# Patient Record
Sex: FEMALE | Race: WHITE | NOT HISPANIC OR LATINO | Employment: UNEMPLOYED | URBAN - METROPOLITAN AREA
[De-identification: names, ages, dates, MRNs, and addresses within clinical notes are randomized per-mention and may not be internally consistent; named-entity substitution may affect disease eponyms.]

---

## 2017-03-07 ENCOUNTER — ALLSCRIPTS OFFICE VISIT (OUTPATIENT)
Dept: OTHER | Facility: OTHER | Age: 62
End: 2017-03-07

## 2017-03-07 DIAGNOSIS — E04.1 NONTOXIC SINGLE THYROID NODULE: ICD-10-CM

## 2017-03-07 DIAGNOSIS — E55.9 VITAMIN D DEFICIENCY: ICD-10-CM

## 2017-03-07 DIAGNOSIS — R73.01 IMPAIRED FASTING GLUCOSE: ICD-10-CM

## 2017-03-07 DIAGNOSIS — M79.10 MYALGIA: ICD-10-CM

## 2017-03-07 DIAGNOSIS — D50.9 IRON DEFICIENCY ANEMIA: ICD-10-CM

## 2017-03-07 DIAGNOSIS — Z13.6 ENCOUNTER FOR SCREENING FOR CARDIOVASCULAR DISORDERS: ICD-10-CM

## 2017-03-08 ENCOUNTER — APPOINTMENT (OUTPATIENT)
Dept: LAB | Facility: CLINIC | Age: 62
End: 2017-03-08
Payer: COMMERCIAL

## 2017-03-08 ENCOUNTER — TRANSCRIBE ORDERS (OUTPATIENT)
Dept: LAB | Facility: CLINIC | Age: 62
End: 2017-03-08

## 2017-03-08 DIAGNOSIS — M79.10 MYALGIA: ICD-10-CM

## 2017-03-08 DIAGNOSIS — E55.9 VITAMIN D DEFICIENCY: ICD-10-CM

## 2017-03-08 DIAGNOSIS — R73.01 IMPAIRED FASTING GLUCOSE: ICD-10-CM

## 2017-03-08 DIAGNOSIS — E04.1 NONTOXIC SINGLE THYROID NODULE: ICD-10-CM

## 2017-03-08 DIAGNOSIS — D50.9 IRON DEFICIENCY ANEMIA: ICD-10-CM

## 2017-03-08 DIAGNOSIS — Z13.6 ENCOUNTER FOR SCREENING FOR CARDIOVASCULAR DISORDERS: ICD-10-CM

## 2017-03-08 LAB
25(OH)D3 SERPL-MCNC: 30.6 NG/ML (ref 30–100)
ALBUMIN SERPL BCP-MCNC: 3.8 G/DL (ref 3.5–5)
ALP SERPL-CCNC: 82 U/L (ref 46–116)
ALT SERPL W P-5'-P-CCNC: 27 U/L (ref 12–78)
ANION GAP SERPL CALCULATED.3IONS-SCNC: 7 MMOL/L (ref 4–13)
AST SERPL W P-5'-P-CCNC: 13 U/L (ref 5–45)
BASOPHILS # BLD AUTO: 0.05 THOUSANDS/ΜL (ref 0–0.1)
BASOPHILS NFR BLD AUTO: 1 % (ref 0–1)
BILIRUB DIRECT SERPL-MCNC: 0.18 MG/DL (ref 0–0.2)
BILIRUB SERPL-MCNC: 0.69 MG/DL (ref 0.2–1)
BUN SERPL-MCNC: 13 MG/DL (ref 5–25)
CALCIUM SERPL-MCNC: 9.3 MG/DL (ref 8.3–10.1)
CHLORIDE SERPL-SCNC: 106 MMOL/L (ref 100–108)
CHOLEST SERPL-MCNC: 177 MG/DL (ref 50–200)
CO2 SERPL-SCNC: 31 MMOL/L (ref 21–32)
CREAT SERPL-MCNC: 0.68 MG/DL (ref 0.6–1.3)
EOSINOPHIL # BLD AUTO: 0.16 THOUSAND/ΜL (ref 0–0.61)
EOSINOPHIL NFR BLD AUTO: 3 % (ref 0–6)
ERYTHROCYTE [DISTWIDTH] IN BLOOD BY AUTOMATED COUNT: 16.2 % (ref 11.6–15.1)
EST. AVERAGE GLUCOSE BLD GHB EST-MCNC: 117 MG/DL
FERRITIN SERPL-MCNC: 21 NG/ML (ref 8–388)
GFR SERPL CREATININE-BSD FRML MDRD: >60 ML/MIN/1.73SQ M
GLUCOSE SERPL-MCNC: 69 MG/DL (ref 65–140)
HBA1C MFR BLD: 5.7 % (ref 4.2–6.3)
HCT VFR BLD AUTO: 43.6 % (ref 34.8–46.1)
HDLC SERPL-MCNC: 86 MG/DL (ref 40–60)
HGB BLD-MCNC: 13.9 G/DL (ref 11.5–15.4)
IRON SERPL-MCNC: 76 UG/DL (ref 50–170)
LDLC SERPL CALC-MCNC: 81 MG/DL (ref 0–100)
LYMPHOCYTES # BLD AUTO: 1.87 THOUSANDS/ΜL (ref 0.6–4.47)
LYMPHOCYTES NFR BLD AUTO: 37 % (ref 14–44)
MCH RBC QN AUTO: 27.7 PG (ref 26.8–34.3)
MCHC RBC AUTO-ENTMCNC: 31.9 G/DL (ref 31.4–37.4)
MCV RBC AUTO: 87 FL (ref 82–98)
MONOCYTES # BLD AUTO: 0.39 THOUSAND/ΜL (ref 0.17–1.22)
MONOCYTES NFR BLD AUTO: 8 % (ref 4–12)
NEUTROPHILS # BLD AUTO: 2.57 THOUSANDS/ΜL (ref 1.85–7.62)
NEUTS SEG NFR BLD AUTO: 51 % (ref 43–75)
NRBC BLD AUTO-RTO: 0 /100 WBCS
PLATELET # BLD AUTO: 338 THOUSANDS/UL (ref 149–390)
PMV BLD AUTO: 11.2 FL (ref 8.9–12.7)
POTASSIUM SERPL-SCNC: 4.2 MMOL/L (ref 3.5–5.3)
PROT SERPL-MCNC: 7.3 G/DL (ref 6.4–8.2)
RBC # BLD AUTO: 5.02 MILLION/UL (ref 3.81–5.12)
SODIUM SERPL-SCNC: 144 MMOL/L (ref 136–145)
TIBC SERPL-MCNC: 475 UG/DL (ref 250–450)
TRANSFERRIN SERPL-MCNC: 369 MG/DL (ref 200–400)
TRIGL SERPL-MCNC: 51 MG/DL
TSH SERPL DL<=0.05 MIU/L-ACNC: 2.7 UIU/ML (ref 0.36–3.74)
WBC # BLD AUTO: 5.05 THOUSAND/UL (ref 4.31–10.16)

## 2017-03-08 PROCEDURE — 36415 COLL VENOUS BLD VENIPUNCTURE: CPT

## 2017-03-08 PROCEDURE — 85025 COMPLETE CBC W/AUTO DIFF WBC: CPT

## 2017-03-08 PROCEDURE — 82306 VITAMIN D 25 HYDROXY: CPT

## 2017-03-08 PROCEDURE — 80061 LIPID PANEL: CPT

## 2017-03-08 PROCEDURE — 84443 ASSAY THYROID STIM HORMONE: CPT

## 2017-03-08 PROCEDURE — 84466 ASSAY OF TRANSFERRIN: CPT

## 2017-03-08 PROCEDURE — 83036 HEMOGLOBIN GLYCOSYLATED A1C: CPT

## 2017-03-08 PROCEDURE — 83540 ASSAY OF IRON: CPT

## 2017-03-08 PROCEDURE — 80048 BASIC METABOLIC PNL TOTAL CA: CPT

## 2017-03-08 PROCEDURE — 83550 IRON BINDING TEST: CPT

## 2017-03-08 PROCEDURE — 82728 ASSAY OF FERRITIN: CPT

## 2017-03-08 PROCEDURE — 80076 HEPATIC FUNCTION PANEL: CPT

## 2017-03-21 ENCOUNTER — ALLSCRIPTS OFFICE VISIT (OUTPATIENT)
Dept: OTHER | Facility: OTHER | Age: 62
End: 2017-03-21

## 2017-08-01 ENCOUNTER — ALLSCRIPTS OFFICE VISIT (OUTPATIENT)
Dept: OTHER | Facility: OTHER | Age: 62
End: 2017-08-01

## 2017-08-01 ENCOUNTER — APPOINTMENT (OUTPATIENT)
Dept: LAB | Facility: CLINIC | Age: 62
End: 2017-08-01
Payer: COMMERCIAL

## 2017-08-01 DIAGNOSIS — D64.9 ANEMIA: ICD-10-CM

## 2017-08-01 DIAGNOSIS — Z13.6 ENCOUNTER FOR SCREENING FOR CARDIOVASCULAR DISORDERS: ICD-10-CM

## 2017-08-01 DIAGNOSIS — E55.9 VITAMIN D DEFICIENCY: ICD-10-CM

## 2017-08-01 DIAGNOSIS — R73.01 IMPAIRED FASTING GLUCOSE: ICD-10-CM

## 2017-08-01 LAB
25(OH)D3 SERPL-MCNC: 36.4 NG/ML (ref 30–100)
ALBUMIN SERPL BCP-MCNC: 3.9 G/DL (ref 3.5–5)
ALP SERPL-CCNC: 89 U/L (ref 46–116)
ALT SERPL W P-5'-P-CCNC: 28 U/L (ref 12–78)
ANION GAP SERPL CALCULATED.3IONS-SCNC: 6 MMOL/L (ref 4–13)
AST SERPL W P-5'-P-CCNC: 21 U/L (ref 5–45)
BASOPHILS # BLD AUTO: 0.05 THOUSANDS/ΜL (ref 0–0.1)
BASOPHILS NFR BLD AUTO: 1 % (ref 0–1)
BILIRUB SERPL-MCNC: 0.63 MG/DL (ref 0.2–1)
BUN SERPL-MCNC: 13 MG/DL (ref 5–25)
CALCIUM SERPL-MCNC: 9 MG/DL (ref 8.3–10.1)
CHLORIDE SERPL-SCNC: 106 MMOL/L (ref 100–108)
CHOLEST SERPL-MCNC: 150 MG/DL (ref 50–200)
CO2 SERPL-SCNC: 28 MMOL/L (ref 21–32)
CREAT SERPL-MCNC: 0.7 MG/DL (ref 0.6–1.3)
EOSINOPHIL # BLD AUTO: 0.21 THOUSAND/ΜL (ref 0–0.61)
EOSINOPHIL NFR BLD AUTO: 3 % (ref 0–6)
ERYTHROCYTE [DISTWIDTH] IN BLOOD BY AUTOMATED COUNT: 13.1 % (ref 11.6–15.1)
EST. AVERAGE GLUCOSE BLD GHB EST-MCNC: 120 MG/DL
FERRITIN SERPL-MCNC: 34 NG/ML (ref 8–388)
GFR SERPL CREATININE-BSD FRML MDRD: 94 ML/MIN/1.73SQ M
GLUCOSE SERPL-MCNC: 91 MG/DL (ref 65–140)
HBA1C MFR BLD: 5.8 % (ref 4.2–6.3)
HCT VFR BLD AUTO: 42.2 % (ref 34.8–46.1)
HDLC SERPL-MCNC: 70 MG/DL (ref 40–60)
HGB BLD-MCNC: 13.9 G/DL (ref 11.5–15.4)
IRON SERPL-MCNC: 67 UG/DL (ref 50–170)
LDLC SERPL CALC-MCNC: 51 MG/DL (ref 0–100)
LYMPHOCYTES # BLD AUTO: 2.24 THOUSANDS/ΜL (ref 0.6–4.47)
LYMPHOCYTES NFR BLD AUTO: 32 % (ref 14–44)
MCH RBC QN AUTO: 29.8 PG (ref 26.8–34.3)
MCHC RBC AUTO-ENTMCNC: 32.9 G/DL (ref 31.4–37.4)
MCV RBC AUTO: 90 FL (ref 82–98)
MONOCYTES # BLD AUTO: 0.55 THOUSAND/ΜL (ref 0.17–1.22)
MONOCYTES NFR BLD AUTO: 8 % (ref 4–12)
NEUTROPHILS # BLD AUTO: 3.95 THOUSANDS/ΜL (ref 1.85–7.62)
NEUTS SEG NFR BLD AUTO: 56 % (ref 43–75)
NRBC BLD AUTO-RTO: 0 /100 WBCS
PLATELET # BLD AUTO: 317 THOUSANDS/UL (ref 149–390)
PMV BLD AUTO: 12 FL (ref 8.9–12.7)
POTASSIUM SERPL-SCNC: 3.9 MMOL/L (ref 3.5–5.3)
PROT SERPL-MCNC: 7.3 G/DL (ref 6.4–8.2)
RBC # BLD AUTO: 4.67 MILLION/UL (ref 3.81–5.12)
SODIUM SERPL-SCNC: 140 MMOL/L (ref 136–145)
TIBC SERPL-MCNC: 450 UG/DL (ref 250–450)
TRANSFERRIN SERPL-MCNC: 378 MG/DL (ref 200–400)
TRIGL SERPL-MCNC: 143 MG/DL
TSH SERPL DL<=0.05 MIU/L-ACNC: 1.93 UIU/ML (ref 0.36–3.74)
WBC # BLD AUTO: 7.01 THOUSAND/UL (ref 4.31–10.16)

## 2017-08-01 PROCEDURE — 84466 ASSAY OF TRANSFERRIN: CPT

## 2017-08-01 PROCEDURE — 85025 COMPLETE CBC W/AUTO DIFF WBC: CPT

## 2017-08-01 PROCEDURE — 83550 IRON BINDING TEST: CPT

## 2017-08-01 PROCEDURE — 80053 COMPREHEN METABOLIC PANEL: CPT

## 2017-08-01 PROCEDURE — 83036 HEMOGLOBIN GLYCOSYLATED A1C: CPT

## 2017-08-01 PROCEDURE — 36415 COLL VENOUS BLD VENIPUNCTURE: CPT

## 2017-08-01 PROCEDURE — 83540 ASSAY OF IRON: CPT

## 2017-08-01 PROCEDURE — 84443 ASSAY THYROID STIM HORMONE: CPT

## 2017-08-01 PROCEDURE — 82306 VITAMIN D 25 HYDROXY: CPT

## 2017-08-01 PROCEDURE — 82728 ASSAY OF FERRITIN: CPT

## 2017-08-01 PROCEDURE — 80061 LIPID PANEL: CPT

## 2017-08-10 ENCOUNTER — GENERIC CONVERSION - ENCOUNTER (OUTPATIENT)
Dept: OTHER | Facility: OTHER | Age: 62
End: 2017-08-10

## 2017-08-22 ENCOUNTER — ALLSCRIPTS OFFICE VISIT (OUTPATIENT)
Dept: OTHER | Facility: OTHER | Age: 62
End: 2017-08-22

## 2017-09-25 DIAGNOSIS — E55.9 VITAMIN D DEFICIENCY: ICD-10-CM

## 2017-09-25 DIAGNOSIS — Z13.6 ENCOUNTER FOR SCREENING FOR CARDIOVASCULAR DISORDERS: ICD-10-CM

## 2017-09-25 DIAGNOSIS — D64.9 ANEMIA: ICD-10-CM

## 2017-09-25 DIAGNOSIS — R73.01 IMPAIRED FASTING GLUCOSE: ICD-10-CM

## 2018-01-12 VITALS
HEIGHT: 66 IN | DIASTOLIC BLOOD PRESSURE: 88 MMHG | BODY MASS INDEX: 25.57 KG/M2 | SYSTOLIC BLOOD PRESSURE: 138 MMHG | HEART RATE: 80 BPM | WEIGHT: 159.13 LBS

## 2018-01-13 VITALS
HEIGHT: 66 IN | DIASTOLIC BLOOD PRESSURE: 70 MMHG | WEIGHT: 159.25 LBS | OXYGEN SATURATION: 98 % | SYSTOLIC BLOOD PRESSURE: 110 MMHG | BODY MASS INDEX: 25.59 KG/M2 | HEART RATE: 78 BPM

## 2018-01-14 VITALS
HEART RATE: 60 BPM | BODY MASS INDEX: 25.57 KG/M2 | DIASTOLIC BLOOD PRESSURE: 76 MMHG | WEIGHT: 159.13 LBS | HEIGHT: 66 IN | SYSTOLIC BLOOD PRESSURE: 124 MMHG

## 2018-01-14 VITALS
WEIGHT: 157.25 LBS | BODY MASS INDEX: 25.27 KG/M2 | HEART RATE: 80 BPM | HEIGHT: 66 IN | SYSTOLIC BLOOD PRESSURE: 112 MMHG | DIASTOLIC BLOOD PRESSURE: 82 MMHG

## 2018-02-26 DIAGNOSIS — R73.01 IMPAIRED FASTING GLUCOSE: ICD-10-CM

## 2018-02-26 DIAGNOSIS — D50.9 IRON DEFICIENCY ANEMIA: ICD-10-CM

## 2018-02-26 DIAGNOSIS — E04.1 NONTOXIC SINGLE THYROID NODULE: ICD-10-CM

## 2018-02-26 DIAGNOSIS — Z13.6 ENCOUNTER FOR SCREENING FOR CARDIOVASCULAR DISORDERS: ICD-10-CM

## 2018-02-28 ENCOUNTER — APPOINTMENT (OUTPATIENT)
Dept: LAB | Facility: CLINIC | Age: 63
End: 2018-02-28
Payer: COMMERCIAL

## 2018-02-28 DIAGNOSIS — E04.1 NONTOXIC SINGLE THYROID NODULE: ICD-10-CM

## 2018-02-28 DIAGNOSIS — R73.01 IMPAIRED FASTING GLUCOSE: ICD-10-CM

## 2018-02-28 DIAGNOSIS — Z13.6 ENCOUNTER FOR SCREENING FOR CARDIOVASCULAR DISORDERS: ICD-10-CM

## 2018-02-28 DIAGNOSIS — D64.9 ANEMIA, UNSPECIFIED TYPE: Primary | ICD-10-CM

## 2018-02-28 DIAGNOSIS — D50.9 IRON DEFICIENCY ANEMIA: ICD-10-CM

## 2018-02-28 DIAGNOSIS — E55.9 MILD VITAMIN D DEFICIENCY: ICD-10-CM

## 2018-02-28 LAB
ALBUMIN SERPL BCP-MCNC: 4.2 G/DL (ref 3.5–5)
ALP SERPL-CCNC: 77 U/L (ref 46–116)
ALT SERPL W P-5'-P-CCNC: 27 U/L (ref 12–78)
ANION GAP SERPL CALCULATED.3IONS-SCNC: 5 MMOL/L (ref 4–13)
AST SERPL W P-5'-P-CCNC: 15 U/L (ref 5–45)
BILIRUB SERPL-MCNC: 0.49 MG/DL (ref 0.2–1)
BUN SERPL-MCNC: 16 MG/DL (ref 5–25)
CALCIUM SERPL-MCNC: 9.3 MG/DL (ref 8.3–10.1)
CHLORIDE SERPL-SCNC: 107 MMOL/L (ref 100–108)
CHOLEST SERPL-MCNC: 165 MG/DL (ref 50–200)
CO2 SERPL-SCNC: 29 MMOL/L (ref 21–32)
CREAT SERPL-MCNC: 0.71 MG/DL (ref 0.6–1.3)
ERYTHROCYTE [DISTWIDTH] IN BLOOD BY AUTOMATED COUNT: 13.8 % (ref 11.6–15.1)
EST. AVERAGE GLUCOSE BLD GHB EST-MCNC: 123 MG/DL
GFR SERPL CREATININE-BSD FRML MDRD: 93 ML/MIN/1.73SQ M
GLUCOSE P FAST SERPL-MCNC: 93 MG/DL (ref 65–99)
HBA1C MFR BLD: 5.9 % (ref 4.2–6.3)
HCT VFR BLD AUTO: 42.5 % (ref 34.8–46.1)
HDLC SERPL-MCNC: 75 MG/DL (ref 40–60)
HGB BLD-MCNC: 14.1 G/DL (ref 11.5–15.4)
LDLC SERPL CALC-MCNC: 82 MG/DL (ref 0–100)
MCH RBC QN AUTO: 29.9 PG (ref 26.8–34.3)
MCHC RBC AUTO-ENTMCNC: 33.2 G/DL (ref 31.4–37.4)
MCV RBC AUTO: 90 FL (ref 82–98)
PLATELET # BLD AUTO: 291 THOUSANDS/UL (ref 149–390)
PMV BLD AUTO: 12.5 FL (ref 8.9–12.7)
POTASSIUM SERPL-SCNC: 3.8 MMOL/L (ref 3.5–5.3)
PROT SERPL-MCNC: 7.5 G/DL (ref 6.4–8.2)
RBC # BLD AUTO: 4.71 MILLION/UL (ref 3.81–5.12)
SODIUM SERPL-SCNC: 141 MMOL/L (ref 136–145)
TRIGL SERPL-MCNC: 42 MG/DL
TSH SERPL DL<=0.05 MIU/L-ACNC: 4.87 UIU/ML (ref 0.36–3.74)
WBC # BLD AUTO: 5.56 THOUSAND/UL (ref 4.31–10.16)

## 2018-02-28 PROCEDURE — 80053 COMPREHEN METABOLIC PANEL: CPT

## 2018-02-28 PROCEDURE — 83036 HEMOGLOBIN GLYCOSYLATED A1C: CPT

## 2018-02-28 PROCEDURE — 84443 ASSAY THYROID STIM HORMONE: CPT

## 2018-02-28 PROCEDURE — 36415 COLL VENOUS BLD VENIPUNCTURE: CPT

## 2018-02-28 PROCEDURE — 85027 COMPLETE CBC AUTOMATED: CPT

## 2018-02-28 PROCEDURE — 80061 LIPID PANEL: CPT

## 2018-03-06 ENCOUNTER — OFFICE VISIT (OUTPATIENT)
Dept: INTERNAL MEDICINE CLINIC | Facility: CLINIC | Age: 63
End: 2018-03-06
Payer: COMMERCIAL

## 2018-03-06 ENCOUNTER — LAB (OUTPATIENT)
Dept: LAB | Facility: CLINIC | Age: 63
End: 2018-03-06
Payer: COMMERCIAL

## 2018-03-06 VITALS
DIASTOLIC BLOOD PRESSURE: 70 MMHG | WEIGHT: 164.8 LBS | HEART RATE: 80 BPM | BODY MASS INDEX: 26.6 KG/M2 | SYSTOLIC BLOOD PRESSURE: 120 MMHG

## 2018-03-06 DIAGNOSIS — E55.9 VITAMIN D DEFICIENCY: ICD-10-CM

## 2018-03-06 DIAGNOSIS — E55.9 MILD VITAMIN D DEFICIENCY: ICD-10-CM

## 2018-03-06 DIAGNOSIS — C18.9 MUCINOUS ADENOCARCINOMA OF COLON (HCC): ICD-10-CM

## 2018-03-06 DIAGNOSIS — E03.9 PRIMARY HYPOTHYROIDISM: Primary | ICD-10-CM

## 2018-03-06 PROBLEM — R91.1 PULMONARY NODULE: Status: ACTIVE | Noted: 2018-03-06

## 2018-03-06 PROBLEM — R73.01 IMPAIRED FASTING GLUCOSE: Status: ACTIVE | Noted: 2017-03-07

## 2018-03-06 PROCEDURE — 36415 COLL VENOUS BLD VENIPUNCTURE: CPT

## 2018-03-06 PROCEDURE — 82652 VIT D 1 25-DIHYDROXY: CPT

## 2018-03-06 PROCEDURE — 99214 OFFICE O/P EST MOD 30 MIN: CPT | Performed by: NURSE PRACTITIONER

## 2018-03-06 PROCEDURE — 1036F TOBACCO NON-USER: CPT | Performed by: NURSE PRACTITIONER

## 2018-03-06 RX ORDER — UBIDECARENONE 75 MG
CAPSULE ORAL
COMMUNITY
End: 2018-10-09

## 2018-03-06 RX ORDER — FLUOCINONIDE 0.5 MG/G
OINTMENT TOPICAL
COMMUNITY
Start: 2015-08-04 | End: 2018-10-09

## 2018-03-06 RX ORDER — AZELAIC ACID 0.15 G/G
GEL TOPICAL DAILY
COMMUNITY
Start: 2015-08-04 | End: 2018-10-09

## 2018-03-06 RX ORDER — B-COMPLEX WITH VITAMIN C
TABLET ORAL
COMMUNITY
End: 2018-10-09

## 2018-03-06 RX ORDER — MAG HYDROX/ALUMINUM HYD/SIMETH 400-400-40
SUSPENSION, ORAL (FINAL DOSE FORM) ORAL
COMMUNITY
Start: 2017-03-07

## 2018-03-06 RX ORDER — CHLORAL HYDRATE 500 MG
CAPSULE ORAL
COMMUNITY
End: 2018-10-09

## 2018-03-06 NOTE — PATIENT INSTRUCTIONS
Hypothyroidism-subclinical no overt symptoms continue to monitor    Hx of hypercalcium-likely related to vitamin-D deficiency check vitamin-D level will contact her with the results    Hx of colon cancer-over 1 year cancer-free with normal CT chest abdomen pelvis    Health Maintenance-she is up-to-date with mammogram she follows with [facility] for gynecology    Pulmonary nodule-stable at 1 year [address] is monitoring

## 2018-03-06 NOTE — PROGRESS NOTES
Assessment/Plan:  Hypothyroidism-subclinical no overt symptoms continue to monitor    Hx of hypercalcium-likely related to vitamin-D deficiency check vitamin-D level will contact her with the results    Hx of colon cancer-over 1 year cancer-free with normal CT chest abdomen pelvis    Health Maintenance-she is up-to-date with mammogram she follows with [address] [place] [address] for gynecology    Pulmonary nodule-stable at 1 year [place] [address] [facility] is monitoring         Diagnoses and all orders for this visit:    Primary hypothyroidism    Mild vitamin D deficiency    Mucinous adenocarcinoma of colon ([facility] [address] )    Vitamin D deficiency  -     Vitamin D 1,25 dihydroxy; Future    Other orders  -     Calcium-Magnesium-Vitamin D 185- MG-MG-UNIT CAPS; Take by mouth  -     Azelaic Acid (FINACEA) 15 % cream; Apply topically daily  -     Omega-3 Fatty Acids (FISH OIL) 1,000 mg; Take by mouth  -     fluocinonide (LIDEX) 0 05 % ointment; Apply topically  -     Pediatric Multivitamins-Fl (MULTIVITAMINS/FL PO); Take by mouth  -     B Complex Vitamins (VITAMIN B COMPLEX) TABS; Take by mouth  -     cyanocobalamin (VITAMIN B-12) 100 mcg tablet; Take by mouth  -     Cholecalciferol (VITAMIN D3) 5000 units CAPS; Take by mouth          Subjective:      Patient ID: Nguyen Borrero is a 63 y o  female  Feeling well  Following a vegetarain diet every since her colon ca  Following in [place] [address] for cancer  Had mammogram/had ct to follow up pulm nodules  Calcium was high in feb but now normal          The following portions of the patient's history were reviewed and updated as appropriate: allergies, current medications, past family history, past medical history, past social history, past surgical history and problem list     Review of Systems   Constitutional: Negative for appetite change, chills, diaphoresis, fatigue, fever and unexpected weight change  HENT: Negative for postnasal drip and sneezing      Eyes: Negative for visual disturbance  Respiratory: Negative for chest tightness and shortness of breath  Cardiovascular: Negative for chest pain, palpitations and leg swelling  Gastrointestinal: Negative for abdominal pain and blood in stool  Endocrine: Negative for cold intolerance, heat intolerance, polydipsia, polyphagia and polyuria  Genitourinary: Negative for difficulty urinating, dysuria, frequency and urgency  Musculoskeletal: Negative for arthralgias and myalgias  Skin: Negative for rash and wound  Neurological: Negative for dizziness, weakness, light-headedness and headaches  Hematological: Negative for adenopathy  Psychiatric/Behavioral: Negative for confusion, dysphoric mood and sleep disturbance  The patient is not nervous/anxious  Objective:      /70   Pulse 80   Wt 74 8 kg (164 lb 12 8 oz)   BMI 26 60 kg/m²          Physical Exam   Constitutional: She is oriented to person, place, and time  She appears well-developed  No distress  HENT:   Head: Normocephalic and atraumatic  Nose: Nose normal    Mouth/Throat: Oropharynx is clear and moist    Eyes: Conjunctivae and EOM are normal  Pupils are equal, round, and reactive to light  Neck: Normal range of motion  Neck supple  No JVD present  No tracheal deviation present  No thyromegaly present  Cardiovascular: Normal rate, regular rhythm, normal heart sounds and intact distal pulses  Exam reveals no gallop and no friction rub  No murmur heard  Pulmonary/Chest: Effort normal and breath sounds normal  No respiratory distress  She has no wheezes  She has no rales  Abdominal: Soft  Bowel sounds are normal  She exhibits no distension  There is no tenderness  Musculoskeletal: Normal range of motion  She exhibits no edema  Lymphadenopathy:     She has no cervical adenopathy  Neurological: She is alert and oriented to person, place, and time  No cranial nerve deficit  Skin: Skin is warm and dry  No rash noted  She is not diaphoretic  Psychiatric: She has a normal mood and affect   Her behavior is normal  Judgment and thought content normal

## 2018-03-08 ENCOUNTER — TELEPHONE (OUTPATIENT)
Dept: INTERNAL MEDICINE CLINIC | Facility: CLINIC | Age: 63
End: 2018-03-08

## 2018-03-09 ENCOUNTER — TELEPHONE (OUTPATIENT)
Dept: INTERNAL MEDICINE CLINIC | Facility: CLINIC | Age: 63
End: 2018-03-09

## 2018-03-09 LAB — 1,25(OH)2D3 SERPL-MCNC: 78.5 PG/ML (ref 19.9–79.3)

## 2018-03-09 NOTE — TELEPHONE ENCOUNTER
----- Message from Tomeka Alegre sent at 3/9/2018  4:40 PM EST -----  Her vitamin d was perfect at 78 should be higher than 30    The info for the doctor that does the breast imaging instead of mammogram is dr Raven Couch [phone]  website is [email or web address]

## 2018-03-09 NOTE — PROGRESS NOTES
Her vitamin d was perfect at 68 should be higher than 30    The info for the doctor that does the breast imaging instead of mammogram is dr Qian Rene [phone]  website is [email or web address]

## 2018-03-12 ENCOUNTER — TELEPHONE (OUTPATIENT)
Dept: INTERNAL MEDICINE CLINIC | Facility: CLINIC | Age: 63
End: 2018-03-12

## 2018-03-12 NOTE — TELEPHONE ENCOUNTER
Pt  requesting to speak w/ Mena Arellano regarding her iron deficiency  She's concerned because of her history of cancer     Pls call her at work [phone]

## 2018-08-30 ENCOUNTER — TELEPHONE (OUTPATIENT)
Dept: INTERNAL MEDICINE CLINIC | Facility: CLINIC | Age: 63
End: 2018-08-30

## 2018-08-30 NOTE — TELEPHONE ENCOUNTER
ASKING  TO SPEAK WITH JESU  PT  IS   OUT  OF  TOWN    SHE IS  BEING  TREATED  FOR  CANCER    CX  MESSAGE  SORRY NOW  PT  IS  SAYING  JUST NEEDS APPT

## 2018-10-02 ENCOUNTER — TELEPHONE (OUTPATIENT)
Dept: INTERNAL MEDICINE CLINIC | Facility: CLINIC | Age: 63
End: 2018-10-02

## 2018-10-02 DIAGNOSIS — D50.9 IRON DEFICIENCY ANEMIA, UNSPECIFIED IRON DEFICIENCY ANEMIA TYPE: ICD-10-CM

## 2018-10-02 DIAGNOSIS — Z11.59 ENCOUNTER FOR HEPATITIS C SCREENING TEST FOR LOW RISK PATIENT: ICD-10-CM

## 2018-10-02 DIAGNOSIS — C18.9 MUCINOUS ADENOCARCINOMA OF COLON (HCC): Primary | ICD-10-CM

## 2018-10-02 DIAGNOSIS — E03.9 PRIMARY HYPOTHYROIDISM: ICD-10-CM

## 2018-10-02 DIAGNOSIS — R73.01 IMPAIRED FASTING GLUCOSE: ICD-10-CM

## 2018-10-02 NOTE — TELEPHONE ENCOUNTER
PT WOULD LIKE LABS ORDERED FOR HER 6 MONTH  APPT ON TUES 10/9/18    PLEASE ADVISE, CALL BACK UPON COMPLETION

## 2018-10-03 ENCOUNTER — APPOINTMENT (OUTPATIENT)
Dept: LAB | Facility: CLINIC | Age: 63
End: 2018-10-03
Payer: COMMERCIAL

## 2018-10-03 DIAGNOSIS — E03.9 PRIMARY HYPOTHYROIDISM: ICD-10-CM

## 2018-10-03 DIAGNOSIS — C18.9 MUCINOUS ADENOCARCINOMA OF COLON (HCC): ICD-10-CM

## 2018-10-03 DIAGNOSIS — R73.01 IMPAIRED FASTING GLUCOSE: ICD-10-CM

## 2018-10-03 DIAGNOSIS — Z11.59 ENCOUNTER FOR HEPATITIS C SCREENING TEST FOR LOW RISK PATIENT: ICD-10-CM

## 2018-10-03 DIAGNOSIS — D50.9 IRON DEFICIENCY ANEMIA, UNSPECIFIED IRON DEFICIENCY ANEMIA TYPE: ICD-10-CM

## 2018-10-03 LAB
ALBUMIN SERPL BCP-MCNC: 3.8 G/DL (ref 3.5–5)
ALP SERPL-CCNC: 78 U/L (ref 46–116)
ALT SERPL W P-5'-P-CCNC: 28 U/L (ref 12–78)
ANION GAP SERPL CALCULATED.3IONS-SCNC: 5 MMOL/L (ref 4–13)
AST SERPL W P-5'-P-CCNC: 15 U/L (ref 5–45)
BASOPHILS # BLD AUTO: 0.06 THOUSANDS/ΜL (ref 0–0.1)
BASOPHILS NFR BLD AUTO: 1 % (ref 0–1)
BILIRUB DIRECT SERPL-MCNC: 0.15 MG/DL (ref 0–0.2)
BILIRUB SERPL-MCNC: 0.62 MG/DL (ref 0.2–1)
BUN SERPL-MCNC: 19 MG/DL (ref 5–25)
CALCIUM SERPL-MCNC: 8.8 MG/DL (ref 8.3–10.1)
CHLORIDE SERPL-SCNC: 107 MMOL/L (ref 100–108)
CHOLEST SERPL-MCNC: 157 MG/DL (ref 50–200)
CO2 SERPL-SCNC: 28 MMOL/L (ref 21–32)
CREAT SERPL-MCNC: 0.64 MG/DL (ref 0.6–1.3)
EOSINOPHIL # BLD AUTO: 0.28 THOUSAND/ΜL (ref 0–0.61)
EOSINOPHIL NFR BLD AUTO: 5 % (ref 0–6)
ERYTHROCYTE [DISTWIDTH] IN BLOOD BY AUTOMATED COUNT: 12.9 % (ref 11.6–15.1)
EST. AVERAGE GLUCOSE BLD GHB EST-MCNC: 114 MG/DL
FERRITIN SERPL-MCNC: 25 NG/ML (ref 8–388)
GFR SERPL CREATININE-BSD FRML MDRD: 97 ML/MIN/1.73SQ M
GLUCOSE P FAST SERPL-MCNC: 95 MG/DL (ref 65–99)
HBA1C MFR BLD: 5.6 % (ref 4.2–6.3)
HCT VFR BLD AUTO: 44.1 % (ref 34.8–46.1)
HCV AB SER QL: NORMAL
HDLC SERPL-MCNC: 67 MG/DL (ref 40–60)
HGB BLD-MCNC: 14 G/DL (ref 11.5–15.4)
IMM GRANULOCYTES # BLD AUTO: 0.01 THOUSAND/UL (ref 0–0.2)
IMM GRANULOCYTES NFR BLD AUTO: 0 % (ref 0–2)
IRON SATN MFR SERPL: 11 %
IRON SERPL-MCNC: 49 UG/DL (ref 50–170)
LDLC SERPL CALC-MCNC: 75 MG/DL (ref 0–100)
LYMPHOCYTES # BLD AUTO: 2.02 THOUSANDS/ΜL (ref 0.6–4.47)
LYMPHOCYTES NFR BLD AUTO: 34 % (ref 14–44)
MCH RBC QN AUTO: 29.7 PG (ref 26.8–34.3)
MCHC RBC AUTO-ENTMCNC: 31.7 G/DL (ref 31.4–37.4)
MCV RBC AUTO: 94 FL (ref 82–98)
MONOCYTES # BLD AUTO: 0.46 THOUSAND/ΜL (ref 0.17–1.22)
MONOCYTES NFR BLD AUTO: 8 % (ref 4–12)
NEUTROPHILS # BLD AUTO: 3.04 THOUSANDS/ΜL (ref 1.85–7.62)
NEUTS SEG NFR BLD AUTO: 52 % (ref 43–75)
NONHDLC SERPL-MCNC: 90 MG/DL
NRBC BLD AUTO-RTO: 0 /100 WBCS
PLATELET # BLD AUTO: 306 THOUSANDS/UL (ref 149–390)
PMV BLD AUTO: 11.6 FL (ref 8.9–12.7)
POTASSIUM SERPL-SCNC: 3.5 MMOL/L (ref 3.5–5.3)
PROT SERPL-MCNC: 7.4 G/DL (ref 6.4–8.2)
RBC # BLD AUTO: 4.71 MILLION/UL (ref 3.81–5.12)
SODIUM SERPL-SCNC: 140 MMOL/L (ref 136–145)
TIBC SERPL-MCNC: 431 UG/DL (ref 250–450)
TRIGL SERPL-MCNC: 77 MG/DL
TSH SERPL DL<=0.05 MIU/L-ACNC: 6.91 UIU/ML (ref 0.36–3.74)
WBC # BLD AUTO: 5.87 THOUSAND/UL (ref 4.31–10.16)

## 2018-10-03 PROCEDURE — 80061 LIPID PANEL: CPT

## 2018-10-03 PROCEDURE — 83540 ASSAY OF IRON: CPT

## 2018-10-03 PROCEDURE — 83036 HEMOGLOBIN GLYCOSYLATED A1C: CPT

## 2018-10-03 PROCEDURE — 86803 HEPATITIS C AB TEST: CPT

## 2018-10-03 PROCEDURE — 85025 COMPLETE CBC W/AUTO DIFF WBC: CPT

## 2018-10-03 PROCEDURE — 80076 HEPATIC FUNCTION PANEL: CPT

## 2018-10-03 PROCEDURE — 83550 IRON BINDING TEST: CPT

## 2018-10-03 PROCEDURE — 80048 BASIC METABOLIC PNL TOTAL CA: CPT

## 2018-10-03 PROCEDURE — 84443 ASSAY THYROID STIM HORMONE: CPT

## 2018-10-03 PROCEDURE — 82728 ASSAY OF FERRITIN: CPT

## 2018-10-03 PROCEDURE — 36415 COLL VENOUS BLD VENIPUNCTURE: CPT

## 2018-10-09 ENCOUNTER — OFFICE VISIT (OUTPATIENT)
Dept: INTERNAL MEDICINE CLINIC | Facility: CLINIC | Age: 63
End: 2018-10-09
Payer: COMMERCIAL

## 2018-10-09 VITALS
HEART RATE: 76 BPM | SYSTOLIC BLOOD PRESSURE: 116 MMHG | RESPIRATION RATE: 12 BRPM | DIASTOLIC BLOOD PRESSURE: 78 MMHG | WEIGHT: 169.6 LBS | HEIGHT: 66 IN | BODY MASS INDEX: 27.26 KG/M2

## 2018-10-09 DIAGNOSIS — E55.9 MILD VITAMIN D DEFICIENCY: ICD-10-CM

## 2018-10-09 DIAGNOSIS — E03.9 PRIMARY HYPOTHYROIDISM: ICD-10-CM

## 2018-10-09 DIAGNOSIS — R73.01 IMPAIRED FASTING GLUCOSE: ICD-10-CM

## 2018-10-09 DIAGNOSIS — C18.9 MUCINOUS ADENOCARCINOMA OF COLON (HCC): Primary | ICD-10-CM

## 2018-10-09 DIAGNOSIS — E61.1 LOW SERUM IRON: ICD-10-CM

## 2018-10-09 DIAGNOSIS — R91.1 PULMONARY NODULE: ICD-10-CM

## 2018-10-09 DIAGNOSIS — I83.92 SPIDER VEIN OF LEFT LOWER EXTREMITY: ICD-10-CM

## 2018-10-09 PROCEDURE — 99214 OFFICE O/P EST MOD 30 MIN: CPT | Performed by: NURSE PRACTITIONER

## 2018-10-09 NOTE — PROGRESS NOTES
Assessment/Plan:    Patient Instructions   History of colon cancer resection 9/2016 was having q 6 months scans w/ annual colonscopy  No she will be annual CT and colonscopy q 3 years again following w/ cancer treatment of [facility]- mammogram feb 2018 but we do not have  Declines flu shot-     Subclinical hypothyroidism- continue to monitor check u/s    Low iron- just had colonscopy- check stool  Treat accordingly    Pulmonary nodule following annual CT w/ cancer [facility]         Diagnoses and all orders for this visit:    Mucinous adenocarcinoma of colon ([address] )    Impaired fasting glucose    Primary hypothyroidism  -     US thyroid; Future    Mild vitamin D deficiency    Pulmonary nodule    Low serum iron  -     Occult Blood, Fecal Immunochemical; Future    Spider vein of left lower extremity  -     Ambulatory referral to Vascular Surgery;  Future         Subjective:      Patient ID: Arnulfo John is a 66 y o  female    Stopped exercising  Just had colo/CT which are stable  Had mammogram this year at cancer treatment [facility] b/c not exercising          Current Outpatient Prescriptions:     Cholecalciferol (VITAMIN D3) 5000 units CAPS, Take by mouth, Disp: , Rfl:     Recent Results (from the past 1008 hour(s))   CBC and differential    Collection Time: 10/03/18  8:30 AM   Result Value Ref Range    WBC 5 87 4 31 - 10 16 Thousand/uL    RBC 4 71 3 81 - 5 12 Million/uL    Hemoglobin 14 0 11 5 - 15 4 g/dL    Hematocrit 44 1 34 8 - 46 1 %    MCV 94 82 - 98 fL    MCH 29 7 26 8 - 34 3 pg    MCHC 31 7 31 4 - 37 4 g/dL    RDW 12 9 11 6 - 15 1 %    MPV 11 6 8 9 - 12 7 fL    Platelets 614 838 - 015 Thousands/uL    nRBC 0 /100 WBCs    Neutrophils Relative 52 43 - 75 %    Immat GRANS % 0 0 - 2 %    Lymphocytes Relative 34 14 - 44 %    Monocytes Relative 8 4 - 12 %    Eosinophils Relative 5 0 - 6 %    Basophils Relative 1 0 - 1 %    Neutrophils Absolute 3 04 1 85 - 7 62 Thousands/µL    Immature Grans Absolute 0 01 0 00 - 0 20 Thousand/uL    Lymphocytes Absolute 2 02 0 60 - 4 47 Thousands/µL    Monocytes Absolute 0 46 0 17 - 1 22 Thousand/µL    Eosinophils Absolute 0 28 0 00 - 0 61 Thousand/µL    Basophils Absolute 0 06 0 00 - 0 10 Thousands/µL   Basic metabolic panel    Collection Time: 10/03/18  8:30 AM   Result Value Ref Range    Sodium 140 136 - 145 mmol/L    Potassium 3 5 3 5 - 5 3 mmol/L    Chloride 107 100 - 108 mmol/L    CO2 28 21 - 32 mmol/L    ANION GAP 5 4 - 13 mmol/L    BUN 19 5 - 25 mg/dL    Creatinine 0 64 0 60 - 1 30 mg/dL    Glucose, Fasting 95 65 - 99 mg/dL    Calcium 8 8 8 3 - 10 1 mg/dL    eGFR 97 ml/min/1 73sq m   Hepatic function panel    Collection Time: 10/03/18  8:30 AM   Result Value Ref Range    Total Bilirubin 0 62 0 20 - 1 00 mg/dL    Bilirubin, Direct 0 15 0 00 - 0 20 mg/dL    Alkaline Phosphatase 78 46 - 116 U/L    AST 15 5 - 45 U/L    ALT 28 12 - 78 U/L    Total Protein 7 4 6 4 - 8 2 g/dL    Albumin 3 8 3 5 - 5 0 g/dL   TSH, 3rd generation    Collection Time: 10/03/18  8:30 AM   Result Value Ref Range    TSH 3RD GENERATON 6 910 (H) 0 358 - 3 740 uIU/mL   Lipid panel    Collection Time: 10/03/18  8:30 AM   Result Value Ref Range    Cholesterol 157 50 - 200 mg/dL    Triglycerides 77 <=150 mg/dL    HDL, Direct 67 (H) 40 - 60 mg/dL    LDL Calculated 75 0 - 100 mg/dL    Non-HDL-Chol (CHOL-HDL) 90 mg/dl   Hemoglobin A1C    Collection Time: 10/03/18  8:30 AM   Result Value Ref Range    Hemoglobin A1C 5 6 4 2 - 6 3 %     mg/dl   Hepatitis C antibody    Collection Time: 10/03/18  8:30 AM   Result Value Ref Range    Hepatitis C Ab Non-reactive Non-reactive   Iron Saturation %    Collection Time: 10/03/18  8:30 AM   Result Value Ref Range    Iron Saturation 11 %    TIBC 431 250 - 450 ug/dL    Iron 49 (L) 50 - 170 ug/dL   Ferritin    Collection Time: 10/03/18  8:30 AM   Result Value Ref Range    Ferritin 25 8 - 388 ng/mL       The following portions of the patient's history were reviewed and updated as appropriate: allergies, current medications, past family history, past medical history, past social history, past surgical history and problem list      Review of Systems   Constitutional: Negative for appetite change, chills, diaphoresis, fatigue, fever and unexpected weight change  HENT: Negative for postnasal drip and sneezing  Eyes: Negative for visual disturbance  Respiratory: Negative for chest tightness and shortness of breath  Cardiovascular: Negative for chest pain, palpitations and leg swelling  Gastrointestinal: Negative for abdominal pain and blood in stool  Endocrine: Negative for cold intolerance, heat intolerance, polydipsia, polyphagia and polyuria  Genitourinary: Negative for difficulty urinating, dysuria, frequency and urgency  Musculoskeletal: Negative for arthralgias and myalgias  Skin: Negative for rash and wound  Neurological: Negative for dizziness, weakness, light-headedness and headaches  Hematological: Negative for adenopathy  Psychiatric/Behavioral: Negative for confusion, dysphoric mood and sleep disturbance  The patient is not nervous/anxious  Objective:      /78 (BP Location: Left arm, Patient Position: Sitting)   Pulse 76   Resp 12   Ht 5' 6" (1 676 m)   Wt 76 9 kg (169 lb 9 6 oz)   BMI 27 37 kg/m²        Physical Exam   Constitutional: She is oriented to person, place, and time  She appears well-developed  No distress  HENT:   Head: Normocephalic and atraumatic  Nose: Nose normal    Mouth/Throat: Oropharynx is clear and moist    Eyes: Pupils are equal, round, and reactive to light  Conjunctivae and EOM are normal    Neck: Normal range of motion  Neck supple  No JVD present  No tracheal deviation present  No thyromegaly present  Cardiovascular: Normal rate, regular rhythm, normal heart sounds and intact distal pulses  Exam reveals no gallop and no friction rub      No murmur heard   Pulmonary/Chest: Effort normal and breath sounds normal  No respiratory distress  She has no wheezes  She has no rales  Abdominal: Soft  Bowel sounds are normal  She exhibits no distension  There is no tenderness  Musculoskeletal: Normal range of motion  She exhibits no edema  Lymphadenopathy:     She has no cervical adenopathy  Neurological: She is alert and oriented to person, place, and time  No cranial nerve deficit  Skin: Skin is warm and dry  No rash noted  She is not diaphoretic  Psychiatric: She has a normal mood and affect   Her behavior is normal  Judgment and thought content normal

## 2018-10-09 NOTE — PATIENT INSTRUCTIONS
History of colon cancer resection 9/2016 was having q 6 months scans w/ annual colonscopy  No she will be annual CT and colonscopy q 3 years again following w/ cancer treatment of [facility] maintenance- mammogram feb 2018 but we do not have  Declines flu shot-     Subclinical hypothyroidism- continue to monitor check u/s    Low iron- just had colonscopy- check stool   Treat accordingly    [facility]

## 2018-10-11 ENCOUNTER — TELEPHONE (OUTPATIENT)
Dept: INTERNAL MEDICINE CLINIC | Facility: CLINIC | Age: 63
End: 2018-10-11

## 2018-10-11 NOTE — TELEPHONE ENCOUNTER
----- Message from Clarissa Longo, [address] sent at 10/9/2018 12:38 PM EDT -----  Is it possible to get CT and colonscopy and mammogram from cancer treatment [facility]?

## 2018-10-12 NOTE — TELEPHONE ENCOUNTER
Called [facility] in [place] and yes they have all these tests but the pt needs to sign a release form and needs to be fax down to [phone] there phone is [phone]

## 2018-10-12 NOTE — TELEPHONE ENCOUNTER
Can you let her know    She can come and sign release or she can bring them to me next visit   Its up to her

## 2018-10-16 ENCOUNTER — HOSPITAL ENCOUNTER (OUTPATIENT)
Dept: ULTRASOUND IMAGING | Facility: HOSPITAL | Age: 63
Discharge: HOME | End: 2018-10-16
Payer: COMMERCIAL

## 2018-10-16 DIAGNOSIS — E03.9 PRIMARY HYPOTHYROIDISM: ICD-10-CM

## 2018-10-16 PROCEDURE — 76536 US EXAM OF HEAD AND NECK: CPT

## 2018-10-17 ENCOUNTER — TELEPHONE (OUTPATIENT)
Dept: INTERNAL MEDICINE CLINIC | Facility: CLINIC | Age: 63
End: 2018-10-17

## 2018-10-17 ENCOUNTER — LAB (OUTPATIENT)
Dept: LAB | Facility: CLINIC | Age: 63
End: 2018-10-17
Payer: COMMERCIAL

## 2018-10-17 DIAGNOSIS — E61.1 LOW SERUM IRON: ICD-10-CM

## 2018-10-17 LAB — HEMOCCULT STL QL IA: NEGATIVE

## 2018-10-17 PROCEDURE — G0328 FECAL BLOOD SCRN IMMUNOASSAY: HCPCS

## 2018-10-17 NOTE — TELEPHONE ENCOUNTER
Pt left results in bin - today - cancer treatment center -for [facility]    Any questions - call pt

## 2018-10-17 NOTE — TELEPHONE ENCOUNTER
----- Message from [employer], [address]  sent at 10/17/2018  4:26 PM EDT -----  Her stool test was normal! No evidence of bleeding

## 2019-04-05 ENCOUNTER — TELEPHONE (OUTPATIENT)
Dept: INTERNAL MEDICINE CLINIC | Facility: CLINIC | Age: 64
End: 2019-04-05

## 2019-04-05 DIAGNOSIS — C18.9 MUCINOUS ADENOCARCINOMA OF COLON (HCC): ICD-10-CM

## 2019-04-05 DIAGNOSIS — Z13.6 SCREENING FOR CARDIOVASCULAR CONDITION: ICD-10-CM

## 2019-04-05 DIAGNOSIS — E55.9 MILD VITAMIN D DEFICIENCY: Primary | ICD-10-CM

## 2019-04-05 DIAGNOSIS — D64.9 ANEMIA, UNSPECIFIED TYPE: ICD-10-CM

## 2019-04-05 DIAGNOSIS — R73.01 IMPAIRED FASTING GLUCOSE: ICD-10-CM

## 2019-04-15 ENCOUNTER — APPOINTMENT (OUTPATIENT)
Dept: LAB | Facility: CLINIC | Age: 64
End: 2019-04-15
Payer: COMMERCIAL

## 2019-04-15 DIAGNOSIS — E55.9 MILD VITAMIN D DEFICIENCY: ICD-10-CM

## 2019-04-15 DIAGNOSIS — R73.01 IMPAIRED FASTING GLUCOSE: ICD-10-CM

## 2019-04-15 DIAGNOSIS — Z13.6 SCREENING FOR CARDIOVASCULAR CONDITION: ICD-10-CM

## 2019-04-15 DIAGNOSIS — D64.9 ANEMIA, UNSPECIFIED TYPE: ICD-10-CM

## 2019-04-15 LAB
25(OH)D3 SERPL-MCNC: 30.5 NG/ML (ref 30–100)
ALBUMIN SERPL BCP-MCNC: 3.9 G/DL (ref 3.5–5)
ALP SERPL-CCNC: 77 U/L (ref 46–116)
ALT SERPL W P-5'-P-CCNC: 21 U/L (ref 12–78)
ANION GAP SERPL CALCULATED.3IONS-SCNC: 7 MMOL/L (ref 4–13)
AST SERPL W P-5'-P-CCNC: 14 U/L (ref 5–45)
BASOPHILS # BLD AUTO: 0.06 THOUSANDS/ΜL (ref 0–0.1)
BASOPHILS NFR BLD AUTO: 1 % (ref 0–1)
BILIRUB SERPL-MCNC: 0.39 MG/DL (ref 0.2–1)
BUN SERPL-MCNC: 19 MG/DL (ref 5–25)
CALCIUM SERPL-MCNC: 8.7 MG/DL (ref 8.3–10.1)
CHLORIDE SERPL-SCNC: 111 MMOL/L (ref 100–108)
CHOLEST SERPL-MCNC: 175 MG/DL (ref 50–200)
CO2 SERPL-SCNC: 26 MMOL/L (ref 21–32)
CREAT SERPL-MCNC: 0.67 MG/DL (ref 0.6–1.3)
EOSINOPHIL # BLD AUTO: 0.27 THOUSAND/ΜL (ref 0–0.61)
EOSINOPHIL NFR BLD AUTO: 4 % (ref 0–6)
ERYTHROCYTE [DISTWIDTH] IN BLOOD BY AUTOMATED COUNT: 13.4 % (ref 11.6–15.1)
EST. AVERAGE GLUCOSE BLD GHB EST-MCNC: 114 MG/DL
FERRITIN SERPL-MCNC: 17 NG/ML (ref 8–388)
GFR SERPL CREATININE-BSD FRML MDRD: 95 ML/MIN/1.73SQ M
GLUCOSE P FAST SERPL-MCNC: 92 MG/DL (ref 65–99)
HBA1C MFR BLD: 5.6 % (ref 4.2–6.3)
HCT VFR BLD AUTO: 43.8 % (ref 34.8–46.1)
HDLC SERPL-MCNC: 68 MG/DL (ref 40–60)
HGB BLD-MCNC: 13.7 G/DL (ref 11.5–15.4)
IMM GRANULOCYTES # BLD AUTO: 0.02 THOUSAND/UL (ref 0–0.2)
IMM GRANULOCYTES NFR BLD AUTO: 0 % (ref 0–2)
IRON SATN MFR SERPL: 11 %
IRON SERPL-MCNC: 44 UG/DL (ref 50–170)
LDLC SERPL CALC-MCNC: 90 MG/DL (ref 0–100)
LYMPHOCYTES # BLD AUTO: 2.05 THOUSANDS/ΜL (ref 0.6–4.47)
LYMPHOCYTES NFR BLD AUTO: 30 % (ref 14–44)
MCH RBC QN AUTO: 29.5 PG (ref 26.8–34.3)
MCHC RBC AUTO-ENTMCNC: 31.3 G/DL (ref 31.4–37.4)
MCV RBC AUTO: 94 FL (ref 82–98)
MONOCYTES # BLD AUTO: 0.48 THOUSAND/ΜL (ref 0.17–1.22)
MONOCYTES NFR BLD AUTO: 7 % (ref 4–12)
NEUTROPHILS # BLD AUTO: 3.88 THOUSANDS/ΜL (ref 1.85–7.62)
NEUTS SEG NFR BLD AUTO: 58 % (ref 43–75)
NONHDLC SERPL-MCNC: 107 MG/DL
NRBC BLD AUTO-RTO: 0 /100 WBCS
PLATELET # BLD AUTO: 294 THOUSANDS/UL (ref 149–390)
PMV BLD AUTO: 11.5 FL (ref 8.9–12.7)
POTASSIUM SERPL-SCNC: 4.1 MMOL/L (ref 3.5–5.3)
PROT SERPL-MCNC: 7 G/DL (ref 6.4–8.2)
RBC # BLD AUTO: 4.65 MILLION/UL (ref 3.81–5.12)
SODIUM SERPL-SCNC: 144 MMOL/L (ref 136–145)
T4 FREE SERPL-MCNC: 0.91 NG/DL (ref 0.76–1.46)
TIBC SERPL-MCNC: 402 UG/DL (ref 250–450)
TRIGL SERPL-MCNC: 83 MG/DL
TSH SERPL DL<=0.05 MIU/L-ACNC: 5.6 UIU/ML (ref 0.36–3.74)
WBC # BLD AUTO: 6.76 THOUSAND/UL (ref 4.31–10.16)

## 2019-04-15 PROCEDURE — 80061 LIPID PANEL: CPT

## 2019-04-15 PROCEDURE — 84439 ASSAY OF FREE THYROXINE: CPT

## 2019-04-15 PROCEDURE — 82306 VITAMIN D 25 HYDROXY: CPT

## 2019-04-15 PROCEDURE — 83036 HEMOGLOBIN GLYCOSYLATED A1C: CPT

## 2019-04-15 PROCEDURE — 36415 COLL VENOUS BLD VENIPUNCTURE: CPT

## 2019-04-15 PROCEDURE — 83550 IRON BINDING TEST: CPT

## 2019-04-15 PROCEDURE — 83540 ASSAY OF IRON: CPT

## 2019-04-15 PROCEDURE — 85025 COMPLETE CBC W/AUTO DIFF WBC: CPT

## 2019-04-15 PROCEDURE — 84443 ASSAY THYROID STIM HORMONE: CPT

## 2019-04-15 PROCEDURE — 82728 ASSAY OF FERRITIN: CPT

## 2019-04-15 PROCEDURE — 80053 COMPREHEN METABOLIC PANEL: CPT

## 2019-04-16 ENCOUNTER — APPOINTMENT (OUTPATIENT)
Dept: LAB | Facility: CLINIC | Age: 64
End: 2019-04-16
Payer: COMMERCIAL

## 2019-04-16 DIAGNOSIS — D64.9 ANEMIA, UNSPECIFIED TYPE: ICD-10-CM

## 2019-04-16 LAB — HEMOCCULT STL QL IA: NEGATIVE

## 2019-04-16 PROCEDURE — G0328 FECAL BLOOD SCRN IMMUNOASSAY: HCPCS

## 2019-04-23 ENCOUNTER — OFFICE VISIT (OUTPATIENT)
Dept: INTERNAL MEDICINE CLINIC | Facility: CLINIC | Age: 64
End: 2019-04-23
Payer: COMMERCIAL

## 2019-04-23 VITALS
HEIGHT: 66 IN | SYSTOLIC BLOOD PRESSURE: 124 MMHG | DIASTOLIC BLOOD PRESSURE: 76 MMHG | BODY MASS INDEX: 27.71 KG/M2 | HEART RATE: 69 BPM | WEIGHT: 172.4 LBS

## 2019-04-23 DIAGNOSIS — E55.9 MILD VITAMIN D DEFICIENCY: Primary | ICD-10-CM

## 2019-04-23 DIAGNOSIS — R73.01 IMPAIRED FASTING BLOOD SUGAR: ICD-10-CM

## 2019-04-23 DIAGNOSIS — D64.9 ANEMIA, UNSPECIFIED TYPE: ICD-10-CM

## 2019-04-23 DIAGNOSIS — C18.9 MUCINOUS ADENOCARCINOMA OF COLON (HCC): ICD-10-CM

## 2019-04-23 DIAGNOSIS — Z13.6 SCREENING FOR CARDIOVASCULAR CONDITION: ICD-10-CM

## 2019-04-23 DIAGNOSIS — E61.1 LOW SERUM IRON: ICD-10-CM

## 2019-04-23 DIAGNOSIS — E03.9 PRIMARY HYPOTHYROIDISM: ICD-10-CM

## 2019-04-23 PROCEDURE — 99214 OFFICE O/P EST MOD 30 MIN: CPT | Performed by: NURSE PRACTITIONER

## 2019-04-23 PROCEDURE — 3008F BODY MASS INDEX DOCD: CPT | Performed by: NURSE PRACTITIONER

## 2019-04-23 PROCEDURE — 1036F TOBACCO NON-USER: CPT | Performed by: NURSE PRACTITIONER

## 2019-04-23 RX ORDER — ERGOCALCIFEROL (VITAMIN D2) 1250 MCG
50000 CAPSULE ORAL
COMMUNITY

## 2019-10-23 ENCOUNTER — APPOINTMENT (OUTPATIENT)
Dept: LAB | Facility: CLINIC | Age: 64
End: 2019-10-23
Payer: COMMERCIAL

## 2019-10-23 DIAGNOSIS — Z13.6 SCREENING FOR CARDIOVASCULAR CONDITION: ICD-10-CM

## 2019-10-23 DIAGNOSIS — R73.01 IMPAIRED FASTING BLOOD SUGAR: ICD-10-CM

## 2019-10-23 DIAGNOSIS — D64.9 ANEMIA, UNSPECIFIED TYPE: ICD-10-CM

## 2019-10-23 DIAGNOSIS — E61.1 LOW SERUM IRON: ICD-10-CM

## 2019-10-23 DIAGNOSIS — E03.9 PRIMARY HYPOTHYROIDISM: ICD-10-CM

## 2019-10-23 LAB
ALBUMIN SERPL BCP-MCNC: 3.8 G/DL (ref 3.5–5)
ALP SERPL-CCNC: 84 U/L (ref 46–116)
ALT SERPL W P-5'-P-CCNC: 22 U/L (ref 12–78)
ANION GAP SERPL CALCULATED.3IONS-SCNC: 4 MMOL/L (ref 4–13)
AST SERPL W P-5'-P-CCNC: 12 U/L (ref 5–45)
BASOPHILS # BLD AUTO: 0.06 THOUSANDS/ΜL (ref 0–0.1)
BASOPHILS NFR BLD AUTO: 1 % (ref 0–1)
BILIRUB SERPL-MCNC: 0.35 MG/DL (ref 0.2–1)
BUN SERPL-MCNC: 12 MG/DL (ref 5–25)
CALCIUM SERPL-MCNC: 9.3 MG/DL (ref 8.3–10.1)
CHLORIDE SERPL-SCNC: 108 MMOL/L (ref 100–108)
CHOLEST SERPL-MCNC: 175 MG/DL (ref 50–200)
CO2 SERPL-SCNC: 29 MMOL/L (ref 21–32)
CREAT SERPL-MCNC: 0.68 MG/DL (ref 0.6–1.3)
EOSINOPHIL # BLD AUTO: 0.23 THOUSAND/ΜL (ref 0–0.61)
EOSINOPHIL NFR BLD AUTO: 4 % (ref 0–6)
ERYTHROCYTE [DISTWIDTH] IN BLOOD BY AUTOMATED COUNT: 13.1 % (ref 11.6–15.1)
EST. AVERAGE GLUCOSE BLD GHB EST-MCNC: 111 MG/DL
FERRITIN SERPL-MCNC: 31 NG/ML (ref 8–388)
GFR SERPL CREATININE-BSD FRML MDRD: 94 ML/MIN/1.73SQ M
GLUCOSE SERPL-MCNC: 93 MG/DL (ref 65–140)
HBA1C MFR BLD: 5.5 % (ref 4.2–6.3)
HCT VFR BLD AUTO: 43.4 % (ref 34.8–46.1)
HDLC SERPL-MCNC: 63 MG/DL
HGB BLD-MCNC: 13.5 G/DL (ref 11.5–15.4)
IMM GRANULOCYTES # BLD AUTO: 0.02 THOUSAND/UL (ref 0–0.2)
IMM GRANULOCYTES NFR BLD AUTO: 0 % (ref 0–2)
IRON SATN MFR SERPL: 10 %
IRON SERPL-MCNC: 42 UG/DL (ref 50–170)
LDLC SERPL CALC-MCNC: 95 MG/DL (ref 0–100)
LYMPHOCYTES # BLD AUTO: 1.85 THOUSANDS/ΜL (ref 0.6–4.47)
LYMPHOCYTES NFR BLD AUTO: 32 % (ref 14–44)
MCH RBC QN AUTO: 29.3 PG (ref 26.8–34.3)
MCHC RBC AUTO-ENTMCNC: 31.1 G/DL (ref 31.4–37.4)
MCV RBC AUTO: 94 FL (ref 82–98)
MONOCYTES # BLD AUTO: 0.43 THOUSAND/ΜL (ref 0.17–1.22)
MONOCYTES NFR BLD AUTO: 8 % (ref 4–12)
NEUTROPHILS # BLD AUTO: 3.18 THOUSANDS/ΜL (ref 1.85–7.62)
NEUTS SEG NFR BLD AUTO: 55 % (ref 43–75)
NONHDLC SERPL-MCNC: 112 MG/DL
NRBC BLD AUTO-RTO: 0 /100 WBCS
PLATELET # BLD AUTO: 288 THOUSANDS/UL (ref 149–390)
PMV BLD AUTO: 11.8 FL (ref 8.9–12.7)
POTASSIUM SERPL-SCNC: 4.3 MMOL/L (ref 3.5–5.3)
PROT SERPL-MCNC: 7 G/DL (ref 6.4–8.2)
RBC # BLD AUTO: 4.61 MILLION/UL (ref 3.81–5.12)
SODIUM SERPL-SCNC: 141 MMOL/L (ref 136–145)
T4 FREE SERPL-MCNC: 0.78 NG/DL (ref 0.76–1.46)
TIBC SERPL-MCNC: 416 UG/DL (ref 250–450)
TRIGL SERPL-MCNC: 86 MG/DL
TSH SERPL DL<=0.05 MIU/L-ACNC: 3.97 UIU/ML (ref 0.36–3.74)
WBC # BLD AUTO: 5.77 THOUSAND/UL (ref 4.31–10.16)

## 2019-10-23 PROCEDURE — 83550 IRON BINDING TEST: CPT

## 2019-10-23 PROCEDURE — 85025 COMPLETE CBC W/AUTO DIFF WBC: CPT

## 2019-10-23 PROCEDURE — 83036 HEMOGLOBIN GLYCOSYLATED A1C: CPT

## 2019-10-23 PROCEDURE — 84439 ASSAY OF FREE THYROXINE: CPT

## 2019-10-23 PROCEDURE — 80061 LIPID PANEL: CPT

## 2019-10-23 PROCEDURE — 36415 COLL VENOUS BLD VENIPUNCTURE: CPT

## 2019-10-23 PROCEDURE — 83540 ASSAY OF IRON: CPT

## 2019-10-23 PROCEDURE — 84443 ASSAY THYROID STIM HORMONE: CPT

## 2019-10-23 PROCEDURE — 82728 ASSAY OF FERRITIN: CPT

## 2019-10-23 PROCEDURE — 80053 COMPREHEN METABOLIC PANEL: CPT

## 2019-10-29 ENCOUNTER — OFFICE VISIT (OUTPATIENT)
Dept: INTERNAL MEDICINE CLINIC | Facility: CLINIC | Age: 64
End: 2019-10-29
Payer: COMMERCIAL

## 2019-10-29 VITALS
HEIGHT: 66 IN | SYSTOLIC BLOOD PRESSURE: 122 MMHG | DIASTOLIC BLOOD PRESSURE: 82 MMHG | WEIGHT: 171.2 LBS | RESPIRATION RATE: 14 BRPM | BODY MASS INDEX: 27.51 KG/M2 | HEART RATE: 70 BPM

## 2019-10-29 DIAGNOSIS — E55.9 MILD VITAMIN D DEFICIENCY: ICD-10-CM

## 2019-10-29 DIAGNOSIS — Z12.39 SCREENING FOR BREAST CANCER: ICD-10-CM

## 2019-10-29 DIAGNOSIS — R73.01 IMPAIRED FASTING GLUCOSE: ICD-10-CM

## 2019-10-29 DIAGNOSIS — C18.9 MUCINOUS ADENOCARCINOMA OF COLON (HCC): Primary | ICD-10-CM

## 2019-10-29 DIAGNOSIS — R91.1 PULMONARY NODULE: ICD-10-CM

## 2019-10-29 DIAGNOSIS — D50.9 IRON DEFICIENCY ANEMIA, UNSPECIFIED IRON DEFICIENCY ANEMIA TYPE: ICD-10-CM

## 2019-10-29 DIAGNOSIS — E03.9 PRIMARY HYPOTHYROIDISM: ICD-10-CM

## 2019-10-29 DIAGNOSIS — E55.9 VITAMIN D DEFICIENCY: ICD-10-CM

## 2019-10-29 PROCEDURE — 3008F BODY MASS INDEX DOCD: CPT | Performed by: NURSE PRACTITIONER

## 2019-10-29 PROCEDURE — 99214 OFFICE O/P EST MOD 30 MIN: CPT | Performed by: NURSE PRACTITIONER

## 2019-10-29 NOTE — PATIENT INSTRUCTIONS
History of mucinous adenocarcinoma of the colon resection 9/2016 was having q 6 months scans w/ annual colonscopy  Now she will have annual CT and colonscopy in 2 years at her 7 year diane     Health maintenance- mammogram feb 2018 but we do not have states she has dense breast- she will discuss screening options with gyn  Declines flu shot and any other vaccinations     Subclinical hypothyroidism- continue to monitor      Low iron- just had colonscopy-  Stool negative   Takes liquid iron w/ vit d     [facility]

## 2019-10-29 NOTE — PROGRESS NOTES
Assessment/Plan:    Patient Instructions   History of mucinous adenocarcinoma of the colon resection 9/2016 was having q 6 months scans w/ annual colonscopy  Now she will have annual CT and colonscopy in 2 years at her 7 year diane     Health maintenance- mammogram feb 2018 but we do not have states she has dense breast- she will discuss screening options with gyn  Declines flu shot and any other vaccinations     Subclinical hypothyroidism- continue to monitor      Low iron- just had colonscopy-  Stool negative  Takes liquid iron w/ vit d     [facility]              Diagnoses and all orders for this visit:    Mucinous adenocarcinoma of colon (HCC)    Impaired fasting glucose  -     Hemoglobin A1C; Future    Primary hypothyroidism  -     Lipid panel; Future  -     TSH, 3rd generation with Free T4 reflex; Future    Iron deficiency anemia, unspecified iron deficiency anemia type  -     CBC and differential; Future  -     Comprehensive metabolic panel; Future  -     Iron Panel (Includes Ferritin, Iron Sat%, Iron, and TIBC); Future  -     Occult Blood, Fecal Immunochemical; Future    Mild vitamin D deficiency    Pulmonary nodule    Screening for breast cancer    Vitamin D deficiency  -     Vitamin D 25 hydroxy;  Future    Other orders  -     Cancel: Mammo screening bilateral w 3d & cad; Future         Subjective:      Patient ID: Ricky Hammonds is a 60 y o  female     Exercising 4 days a week doing cardio and strength training  She gets CT abdomen chest and pelvis yearly in August next colonoscopy will not be for 2 years  Had mammogram this year at [facility] prefers to not do any more mammogram would like to go a more natural approach like they are thermography  Follows with a naturopathic takes multiple vitamins follows a very strict diet of only fruits vegetables seeds no dairy no red me no fish  Taking a plant based iron supplement        Current Outpatient Medications:     Cholecalciferol (VITAMIN D3) 5000 units CAPS, Take by mouth, Disp: , Rfl:     ergocalciferol (ERGOCALCIFEROL) 29065 units capsule, Take 50,000 Units by mouth, Disp: , Rfl:     Kiymuj-ZaCxn-KfPwey-[id number]-Omega (MULTIVITAMIN/MINERALS PO), daily  , Disp: , Rfl:     Cats Claw, Uncaria tomentosa, 350 MG CAPS, daily  , Disp: , Rfl:     cholecalciferol (VITAMIN D3) 1,000 units tablet, Take by mouth, Disp: , Rfl:     Recent Results (from the past 1008 hour(s))   CBC and differential    Collection Time: 10/23/19  9:29 AM   Result Value Ref Range    WBC 5 77 4 31 - 10 16 Thousand/uL    RBC 4 61 3 81 - 5 12 Million/uL    Hemoglobin 13 5 11 5 - 15 4 g/dL    Hematocrit 43 4 34 8 - 46 1 %    MCV 94 82 - 98 fL    MCH 29 3 26 8 - 34 3 pg    MCHC 31 1 (L) 31 4 - 37 4 g/dL    RDW 13 1 11 6 - 15 1 %    MPV 11 8 8 9 - 12 7 fL    Platelets 786 311 - 242 Thousands/uL    nRBC 0 /100 WBCs    Neutrophils Relative 55 43 - 75 %    Immat GRANS % 0 0 - 2 %    Lymphocytes Relative 32 14 - 44 %    Monocytes Relative 8 4 - 12 %    Eosinophils Relative 4 0 - 6 %    Basophils Relative 1 0 - 1 %    Neutrophils Absolute 3 18 1 85 - 7 62 Thousands/µL    Immature Grans Absolute 0 02 0 00 - 0 20 Thousand/uL    Lymphocytes Absolute 1 85 0 60 - 4 47 Thousands/µL    Monocytes Absolute 0 43 0 17 - 1 22 Thousand/µL    Eosinophils Absolute 0 23 0 00 - 0 61 Thousand/µL    Basophils Absolute 0 06 0 00 - 0 10 Thousands/µL   Comprehensive metabolic panel    Collection Time: 10/23/19  9:29 AM   Result Value Ref Range    Sodium 141 136 - 145 mmol/L    Potassium 4 3 3 5 - 5 3 mmol/L    Chloride 108 100 - 108 mmol/L    CO2 29 21 - 32 mmol/L    ANION GAP 4 4 - 13 mmol/L    BUN 12 5 - 25 mg/dL    Creatinine 0 68 0 60 - 1 30 mg/dL    Glucose 93 65 - 140 mg/dL    Calcium 9 3 8 3 - 10 1 mg/dL    AST 12 5 - 45 U/L    ALT 22 12 - 78 U/L    Alkaline Phosphatase 84 46 - 116 U/L    Total Protein 7 0 6 4 - 8 2 g/dL    Albumin 3 8 3 5 - 5 0 g/dL    Total Bilirubin 0 35 0 20 - 1 00 mg/dL    eGFR 94 ml/min/1 73sq m   Lipid panel    Collection Time: 10/23/19  9:29 AM   Result Value Ref Range    Cholesterol 175 50 - 200 mg/dL    Triglycerides 86 <=150 mg/dL    HDL, Direct 63 >=40 mg/dL    LDL Calculated 95 0 - 100 mg/dL    Non-HDL-Chol (CHOL-HDL) 112 mg/dl   Hemoglobin A1C    Collection Time: 10/23/19  9:29 AM   Result Value Ref Range    Hemoglobin A1C 5 5 4 2 - 6 3 %     mg/dl   TSH, 3rd generation with Free T4 reflex    Collection Time: 10/23/19  9:29 AM   Result Value Ref Range    TSH 3RD GENERATON 3 970 (H) 0 358 - 3 740 uIU/mL   Iron Saturation %    Collection Time: 10/23/19  9:29 AM   Result Value Ref Range    Iron Saturation 10 %    TIBC 416 250 - 450 ug/dL    Iron 42 (L) 50 - 170 ug/dL   Ferritin    Collection Time: 10/23/19  9:29 AM   Result Value Ref Range    Ferritin 31 8 - 388 ng/mL   T4, free    Collection Time: 10/23/19  9:29 AM   Result Value Ref Range    Free T4 0 78 0 76 - 1 46 ng/dL       The following portions of the patient's history were reviewed and updated as appropriate: allergies, current medications, past family history, past medical history, past social history, past surgical history and problem list      Review of Systems   Constitutional: Negative for appetite change, chills, diaphoresis, fatigue, fever and unexpected weight change  HENT: Negative for postnasal drip and sneezing  Eyes: Negative for visual disturbance  Respiratory: Negative for chest tightness and shortness of breath  Cardiovascular: Negative for chest pain, palpitations and leg swelling  Gastrointestinal: Negative for abdominal pain and blood in stool  Endocrine: Negative for cold intolerance, heat intolerance, polydipsia, polyphagia and polyuria  Genitourinary: Negative for difficulty urinating, dysuria, frequency and urgency  Musculoskeletal: Negative for arthralgias and myalgias  Skin: Negative for rash and wound  Neurological: Negative for dizziness, weakness, light-headedness and headaches  Hematological: Negative for adenopathy  Psychiatric/Behavioral: Negative for confusion, dysphoric mood and sleep disturbance  The patient is not nervous/anxious  Objective:      /82 (BP Location: Left arm, Patient Position: Sitting, Cuff Size: Standard)   Pulse 70   Resp 14   Ht 5' 6" (1 676 m)   Wt 77 7 kg (171 lb 3 2 oz)   BMI 27 63 kg/m²        Physical Exam   Constitutional: She is oriented to person, place, and time  She appears well-developed  No distress  HENT:   Head: Normocephalic and atraumatic  Nose: Nose normal    Mouth/Throat: Oropharynx is clear and moist    Eyes: Pupils are equal, round, and reactive to light  Conjunctivae and EOM are normal    Neck: Normal range of motion  Neck supple  No JVD present  No tracheal deviation present  No thyromegaly present  Cardiovascular: Normal rate, regular rhythm, normal heart sounds and intact distal pulses  Exam reveals no gallop and no friction rub  No murmur heard  Pulmonary/Chest: Effort normal and breath sounds normal  No respiratory distress  She has no wheezes  She has no rales  Abdominal: Soft  Bowel sounds are normal  She exhibits no distension  There is no tenderness  Musculoskeletal: Normal range of motion  She exhibits no edema  Lymphadenopathy:     She has no cervical adenopathy  Neurological: She is alert and oriented to person, place, and time  No cranial nerve deficit  Skin: Skin is warm and dry  No rash noted  She is not diaphoretic  Psychiatric: She has a normal mood and affect  Her behavior is normal  Judgment and thought content normal    BMI Counseling: Body mass index is 27 63 kg/m²  The BMI is above normal  Nutrition recommendations include decreasing portion sizes and limiting drinks that contain sugar  Exercise recommendations include exercising 3-5 times per week  No pharmacotherapy was ordered

## 2020-03-31 ENCOUNTER — APPOINTMENT (OUTPATIENT)
Dept: LAB | Facility: CLINIC | Age: 65
End: 2020-03-31
Payer: COMMERCIAL

## 2020-03-31 DIAGNOSIS — R73.01 IMPAIRED FASTING GLUCOSE: ICD-10-CM

## 2020-03-31 DIAGNOSIS — E55.9 VITAMIN D DEFICIENCY: ICD-10-CM

## 2020-03-31 DIAGNOSIS — D50.9 IRON DEFICIENCY ANEMIA, UNSPECIFIED IRON DEFICIENCY ANEMIA TYPE: ICD-10-CM

## 2020-03-31 DIAGNOSIS — E03.9 PRIMARY HYPOTHYROIDISM: ICD-10-CM

## 2020-03-31 LAB
25(OH)D3 SERPL-MCNC: 33.7 NG/ML (ref 30–100)
ALBUMIN SERPL BCP-MCNC: 4.2 G/DL (ref 3.5–5)
ALP SERPL-CCNC: 85 U/L (ref 46–116)
ALT SERPL W P-5'-P-CCNC: 21 U/L (ref 12–78)
ANION GAP SERPL CALCULATED.3IONS-SCNC: 3 MMOL/L (ref 4–13)
AST SERPL W P-5'-P-CCNC: 14 U/L (ref 5–45)
BASOPHILS # BLD AUTO: 0.07 THOUSANDS/ΜL (ref 0–0.1)
BASOPHILS NFR BLD AUTO: 1 % (ref 0–1)
BILIRUB SERPL-MCNC: 0.74 MG/DL (ref 0.2–1)
BUN SERPL-MCNC: 12 MG/DL (ref 5–25)
CALCIUM SERPL-MCNC: 9.6 MG/DL (ref 8.3–10.1)
CHLORIDE SERPL-SCNC: 107 MMOL/L (ref 100–108)
CHOLEST SERPL-MCNC: 171 MG/DL (ref 50–200)
CO2 SERPL-SCNC: 31 MMOL/L (ref 21–32)
CREAT SERPL-MCNC: 0.78 MG/DL (ref 0.6–1.3)
EOSINOPHIL # BLD AUTO: 0.22 THOUSAND/ΜL (ref 0–0.61)
EOSINOPHIL NFR BLD AUTO: 4 % (ref 0–6)
ERYTHROCYTE [DISTWIDTH] IN BLOOD BY AUTOMATED COUNT: 13.6 % (ref 11.6–15.1)
EST. AVERAGE GLUCOSE BLD GHB EST-MCNC: 108 MG/DL
FERRITIN SERPL-MCNC: 45 NG/ML (ref 8–388)
GFR SERPL CREATININE-BSD FRML MDRD: 82 ML/MIN/1.73SQ M
GLUCOSE P FAST SERPL-MCNC: 87 MG/DL (ref 65–99)
HBA1C MFR BLD: 5.4 %
HCT VFR BLD AUTO: 45.1 % (ref 34.8–46.1)
HDLC SERPL-MCNC: 73 MG/DL
HGB BLD-MCNC: 14.6 G/DL (ref 11.5–15.4)
IMM GRANULOCYTES # BLD AUTO: 0.01 THOUSAND/UL (ref 0–0.2)
IMM GRANULOCYTES NFR BLD AUTO: 0 % (ref 0–2)
IRON SATN MFR SERPL: 21 %
IRON SERPL-MCNC: 92 UG/DL (ref 50–170)
LDLC SERPL CALC-MCNC: 82 MG/DL (ref 0–100)
LYMPHOCYTES # BLD AUTO: 1.6 THOUSANDS/ΜL (ref 0.6–4.47)
LYMPHOCYTES NFR BLD AUTO: 30 % (ref 14–44)
MCH RBC QN AUTO: 30.1 PG (ref 26.8–34.3)
MCHC RBC AUTO-ENTMCNC: 32.4 G/DL (ref 31.4–37.4)
MCV RBC AUTO: 93 FL (ref 82–98)
MONOCYTES # BLD AUTO: 0.43 THOUSAND/ΜL (ref 0.17–1.22)
MONOCYTES NFR BLD AUTO: 8 % (ref 4–12)
NEUTROPHILS # BLD AUTO: 3.08 THOUSANDS/ΜL (ref 1.85–7.62)
NEUTS SEG NFR BLD AUTO: 57 % (ref 43–75)
NONHDLC SERPL-MCNC: 98 MG/DL
NRBC BLD AUTO-RTO: 0 /100 WBCS
PLATELET # BLD AUTO: 322 THOUSANDS/UL (ref 149–390)
PMV BLD AUTO: 11.5 FL (ref 8.9–12.7)
POTASSIUM SERPL-SCNC: 3.9 MMOL/L (ref 3.5–5.3)
PROT SERPL-MCNC: 7.5 G/DL (ref 6.4–8.2)
RBC # BLD AUTO: 4.85 MILLION/UL (ref 3.81–5.12)
SODIUM SERPL-SCNC: 141 MMOL/L (ref 136–145)
T4 FREE SERPL-MCNC: 0.84 NG/DL (ref 0.76–1.46)
TIBC SERPL-MCNC: 445 UG/DL (ref 250–450)
TRIGL SERPL-MCNC: 82 MG/DL
TSH SERPL DL<=0.05 MIU/L-ACNC: 6.2 UIU/ML (ref 0.36–3.74)
WBC # BLD AUTO: 5.41 THOUSAND/UL (ref 4.31–10.16)

## 2020-03-31 PROCEDURE — 80053 COMPREHEN METABOLIC PANEL: CPT

## 2020-03-31 PROCEDURE — 80061 LIPID PANEL: CPT

## 2020-03-31 PROCEDURE — 82728 ASSAY OF FERRITIN: CPT

## 2020-03-31 PROCEDURE — 85025 COMPLETE CBC W/AUTO DIFF WBC: CPT

## 2020-03-31 PROCEDURE — 84439 ASSAY OF FREE THYROXINE: CPT

## 2020-03-31 PROCEDURE — 84443 ASSAY THYROID STIM HORMONE: CPT

## 2020-03-31 PROCEDURE — 83540 ASSAY OF IRON: CPT

## 2020-03-31 PROCEDURE — 36415 COLL VENOUS BLD VENIPUNCTURE: CPT

## 2020-03-31 PROCEDURE — 83036 HEMOGLOBIN GLYCOSYLATED A1C: CPT

## 2020-03-31 PROCEDURE — 83550 IRON BINDING TEST: CPT

## 2020-03-31 PROCEDURE — 82306 VITAMIN D 25 HYDROXY: CPT

## 2020-04-01 ENCOUNTER — APPOINTMENT (OUTPATIENT)
Dept: LAB | Facility: CLINIC | Age: 65
End: 2020-04-01
Payer: COMMERCIAL

## 2020-04-01 DIAGNOSIS — D50.9 IRON DEFICIENCY ANEMIA, UNSPECIFIED IRON DEFICIENCY ANEMIA TYPE: ICD-10-CM

## 2020-04-01 LAB — HEMOCCULT STL QL IA: NEGATIVE

## 2020-04-01 PROCEDURE — G0328 FECAL BLOOD SCRN IMMUNOASSAY: HCPCS

## 2020-04-21 ENCOUNTER — TELEPHONE (OUTPATIENT)
Dept: INTERNAL MEDICINE CLINIC | Facility: CLINIC | Age: 65
End: 2020-04-21

## 2020-04-21 ENCOUNTER — TELEMEDICINE (OUTPATIENT)
Dept: INTERNAL MEDICINE CLINIC | Facility: CLINIC | Age: 65
End: 2020-04-21
Payer: COMMERCIAL

## 2020-04-21 VITALS — WEIGHT: 171 LBS | TEMPERATURE: 98.6 F | BODY MASS INDEX: 27.6 KG/M2

## 2020-04-21 DIAGNOSIS — E03.9 PRIMARY HYPOTHYROIDISM: ICD-10-CM

## 2020-04-21 DIAGNOSIS — R73.01 IMPAIRED FASTING GLUCOSE: ICD-10-CM

## 2020-04-21 DIAGNOSIS — C18.9 MUCINOUS ADENOCARCINOMA OF COLON (HCC): Primary | ICD-10-CM

## 2020-04-21 DIAGNOSIS — R91.1 PULMONARY NODULE: ICD-10-CM

## 2020-04-21 DIAGNOSIS — E55.9 MILD VITAMIN D DEFICIENCY: ICD-10-CM

## 2020-04-21 DIAGNOSIS — D50.9 IRON DEFICIENCY ANEMIA, UNSPECIFIED IRON DEFICIENCY ANEMIA TYPE: ICD-10-CM

## 2020-04-21 PROCEDURE — 99213 OFFICE O/P EST LOW 20 MIN: CPT | Performed by: NURSE PRACTITIONER

## 2020-05-04 ENCOUNTER — TELEMEDICINE (OUTPATIENT)
Dept: INTERNAL MEDICINE CLINIC | Facility: CLINIC | Age: 65
End: 2020-05-04
Payer: COMMERCIAL

## 2020-05-04 DIAGNOSIS — Z12.31 BREAST CANCER SCREENING BY MAMMOGRAM: ICD-10-CM

## 2020-05-04 DIAGNOSIS — J02.9 SORE THROAT: Primary | ICD-10-CM

## 2020-05-04 PROCEDURE — 99213 OFFICE O/P EST LOW 20 MIN: CPT | Performed by: NURSE PRACTITIONER

## 2020-05-06 ENCOUNTER — TELEPHONE (OUTPATIENT)
Dept: INTERNAL MEDICINE CLINIC | Facility: CLINIC | Age: 65
End: 2020-05-06

## 2020-05-07 ENCOUNTER — TELEPHONE (OUTPATIENT)
Dept: INTERNAL MEDICINE CLINIC | Facility: CLINIC | Age: 65
End: 2020-05-07

## 2020-05-07 ENCOUNTER — TELEMEDICINE (OUTPATIENT)
Dept: INTERNAL MEDICINE CLINIC | Facility: CLINIC | Age: 65
End: 2020-05-07
Payer: COMMERCIAL

## 2020-05-07 DIAGNOSIS — E04.1 THYROID NODULE: Primary | ICD-10-CM

## 2020-05-07 DIAGNOSIS — R07.0 THROAT PAIN: ICD-10-CM

## 2020-05-07 DIAGNOSIS — R49.9 CHANGE OF VOICE: ICD-10-CM

## 2020-05-07 PROCEDURE — 99214 OFFICE O/P EST MOD 30 MIN: CPT | Performed by: INTERNAL MEDICINE

## 2020-05-19 ENCOUNTER — TELEPHONE (OUTPATIENT)
Dept: INTERNAL MEDICINE CLINIC | Facility: CLINIC | Age: 65
End: 2020-05-19

## 2020-05-19 ENCOUNTER — HOSPITAL ENCOUNTER (OUTPATIENT)
Dept: ULTRASOUND IMAGING | Facility: HOSPITAL | Age: 65
Discharge: HOME | End: 2020-05-19
Payer: COMMERCIAL

## 2020-05-19 DIAGNOSIS — Z11.1 SCREENING FOR TUBERCULOSIS: Primary | ICD-10-CM

## 2020-05-19 DIAGNOSIS — E04.1 THYROID NODULE: ICD-10-CM

## 2020-05-19 DIAGNOSIS — Z20.822 EXPOSURE TO COVID-19 VIRUS: ICD-10-CM

## 2020-05-19 PROCEDURE — 76536 US EXAM OF HEAD AND NECK: CPT

## 2020-05-26 ENCOUNTER — APPOINTMENT (OUTPATIENT)
Dept: LAB | Facility: CLINIC | Age: 65
End: 2020-05-26
Payer: COMMERCIAL

## 2020-05-26 DIAGNOSIS — Z20.822 EXPOSURE TO COVID-19 VIRUS: ICD-10-CM

## 2020-05-26 PROCEDURE — 86769 SARS-COV-2 COVID-19 ANTIBODY: CPT

## 2020-05-27 LAB — SARS-COV-2 IGG SERPL QL IA: NEGATIVE

## 2020-05-28 ENCOUNTER — TELEPHONE (OUTPATIENT)
Dept: INTERNAL MEDICINE CLINIC | Facility: CLINIC | Age: 65
End: 2020-05-28

## 2020-06-11 ENCOUNTER — TELEPHONE (OUTPATIENT)
Dept: INTERNAL MEDICINE CLINIC | Facility: CLINIC | Age: 65
End: 2020-06-11

## 2020-07-21 ENCOUNTER — TELEPHONE (OUTPATIENT)
Dept: INTERNAL MEDICINE CLINIC | Facility: CLINIC | Age: 65
End: 2020-07-21

## 2020-07-21 DIAGNOSIS — R01.1 MURMUR: Primary | ICD-10-CM

## 2020-07-21 DIAGNOSIS — R00.2 PALPITATIONS: ICD-10-CM

## 2020-07-21 NOTE — TELEPHONE ENCOUNTER
Patient wants to talk with Emiliano Ly about taking "Thiamine" Dr Leopoldo Bailey advised that she should , but she already take vitamin B complex and it has thiamine in it        Wants to make sure its okay    and a recommend to a cardiologist

## 2020-07-22 ENCOUNTER — TELEPHONE (OUTPATIENT)
Dept: INTERNAL MEDICINE CLINIC | Facility: CLINIC | Age: 65
End: 2020-07-22

## 2020-07-22 NOTE — TELEPHONE ENCOUNTER
Scheduled patient an appointment:    24-hour Holter:  Tuesday-07/28/2020 @1:45PM    Echo:   Wednesday-07/29/2020 @ 3:00PM    Constantine Dominique [address] [place] [address]

## 2020-07-23 ENCOUNTER — TELEPHONE (OUTPATIENT)
Dept: INTERNAL MEDICINE CLINIC | Facility: CLINIC | Age: 65
End: 2020-07-23

## 2020-07-28 ENCOUNTER — HOSPITAL ENCOUNTER (OUTPATIENT)
Dept: NON INVASIVE DIAGNOSTICS | Facility: CLINIC | Age: 65
Discharge: HOME | End: 2020-07-28
Payer: COMMERCIAL

## 2020-07-28 DIAGNOSIS — R00.2 PALPITATIONS: ICD-10-CM

## 2020-07-28 PROCEDURE — 93225 XTRNL ECG REC<48 HRS REC: CPT

## 2020-07-28 PROCEDURE — 93226 XTRNL ECG REC<48 HR SCAN A/R: CPT

## 2020-07-29 ENCOUNTER — TELEPHONE (OUTPATIENT)
Dept: INTERNAL MEDICINE CLINIC | Facility: CLINIC | Age: 65
End: 2020-07-29

## 2020-07-29 NOTE — TELEPHONE ENCOUNTER
Patient is requesting a call back from [facility] regarding her holter monitor    Call back  # [phone]

## 2020-07-30 NOTE — TELEPHONE ENCOUNTER
Spoke to patient, there was some miscommunication, she was not able to get echo, she completed Holter will try to obtain these results    Will try to expedite echo as well

## 2020-07-30 NOTE — TELEPHONE ENCOUNTER
She wants to speak to [facility]    She said her heart monitor didn't go off  Was suppose to go off @ 3:00  She [facility] to [facility] yesterday  She was not able to have the echo   [place] She arrived 3:21 and they said it was too late because they leave @ 4:00  She is in CT now    But would like [facility] to call her, when she can

## 2020-08-02 PROCEDURE — 93227 XTRNL ECG REC<48 HR R&I: CPT | Performed by: INTERNAL MEDICINE

## 2020-08-03 ENCOUNTER — TELEPHONE (OUTPATIENT)
Dept: INTERNAL MEDICINE CLINIC | Facility: CLINIC | Age: 65
End: 2020-08-03

## 2020-08-03 NOTE — TELEPHONE ENCOUNTER
----- Message from Rukhsana Baeza, Tomeka Cao sent at 8/3/2020  7:42 AM EDT -----  Notify that there was nothing significant on her holter monitor

## 2020-08-03 NOTE — TELEPHONE ENCOUNTER
Spoke to  at [facility] they will talk to  [facility] and call us back

## 2020-08-05 ENCOUNTER — HOSPITAL ENCOUNTER (OUTPATIENT)
Dept: NON INVASIVE DIAGNOSTICS | Facility: CLINIC | Age: 65
Discharge: HOME | End: 2020-08-05
Payer: COMMERCIAL

## 2020-08-05 DIAGNOSIS — R01.1 MURMUR: ICD-10-CM

## 2020-08-05 PROCEDURE — 93306 TTE W/DOPPLER COMPLETE: CPT

## 2020-08-05 PROCEDURE — 93306 TTE W/DOPPLER COMPLETE: CPT | Performed by: INTERNAL MEDICINE

## 2020-08-11 ENCOUNTER — TELEPHONE (OUTPATIENT)
Dept: INTERNAL MEDICINE CLINIC | Facility: CLINIC | Age: 65
End: 2020-08-11

## 2020-08-11 NOTE — TELEPHONE ENCOUNTER
----- Message from Amirah Chacon, [address]  sent at 8/11/2020 10:43 AM EDT -----  Her echo was pretty normal she has a tiny leak at the mitral valve- this wont' cause palpitations- I belive I put a consult in for cardiology- can we get her an appt if she wants one

## 2020-08-13 NOTE — TELEPHONE ENCOUNTER
PT called back and I informed her of David Jeffrey message, pt will make an appt for cardiology as recommended by Ashlee Abts

## 2020-08-25 ENCOUNTER — OFFICE VISIT (OUTPATIENT)
Dept: CARDIOLOGY CLINIC | Facility: CLINIC | Age: 65
End: 2020-08-25
Payer: COMMERCIAL

## 2020-08-25 VITALS
TEMPERATURE: 98.7 F | RESPIRATION RATE: 18 BRPM | DIASTOLIC BLOOD PRESSURE: 80 MMHG | SYSTOLIC BLOOD PRESSURE: 112 MMHG | HEIGHT: 66 IN | BODY MASS INDEX: 25.71 KG/M2 | WEIGHT: 160 LBS | OXYGEN SATURATION: 99 % | HEART RATE: 72 BPM

## 2020-08-25 DIAGNOSIS — E55.9 MILD VITAMIN D DEFICIENCY: ICD-10-CM

## 2020-08-25 DIAGNOSIS — R00.2 PALPITATION: ICD-10-CM

## 2020-08-25 DIAGNOSIS — R91.1 PULMONARY NODULE: ICD-10-CM

## 2020-08-25 DIAGNOSIS — R07.2 PRECORDIAL PAIN: Primary | ICD-10-CM

## 2020-08-25 PROCEDURE — 93000 ELECTROCARDIOGRAM COMPLETE: CPT | Performed by: INTERNAL MEDICINE

## 2020-08-25 PROCEDURE — 1036F TOBACCO NON-USER: CPT | Performed by: INTERNAL MEDICINE

## 2020-08-25 PROCEDURE — 99204 OFFICE O/P NEW MOD 45 MIN: CPT | Performed by: INTERNAL MEDICINE

## 2020-08-25 PROCEDURE — 3008F BODY MASS INDEX DOCD: CPT | Performed by: INTERNAL MEDICINE

## 2020-08-25 NOTE — PROGRESS NOTES
Cardiology Consultation     Gabrielle Carlos  [id number]  1955  [address] [place] [id number]    1  Precordial pain  Cardiac event monitor    NM myocardial perfusion spect (stress and/or rest)   2  Palpitation  Cardiac event monitor    NM myocardial perfusion spect (stress and/or rest)       Chief Complaint:   palpitation, chest pain    HPI:   29-year-old female with colon cancer status post surgery in September of 2016 currently in remission,  OCD presented for evaluation of palpitation     patient has chronic palpitation but for last few months they are more frequent and also very fast   Palpitation happen mostly in the morning lasting few seconds  Palpitation is intermittent and happens few days or sometimes once a week  She believes that since she has stop thiamine her palpitations are more frequent  She also complains of left chest discomfort on exertion associated with shortness of breath and resolves on resting which is going on for almost a year  Patient is not exerting at present time to fear of getting chest pain  Patient denies diaphoresis, nausea, vomiting, fever, chills, leg edema or loss of consciousness  she denies personal history of myocardial infarction, TIA/ CVA, heart failure, arrhythmia or peripheral vascular disease    Social History:  Denies smoking, alcohol intake or illicit drug use    She was a passive smoker  Family History: denies family history of coronary artery disease     labs including vitals reviewed   EKG today shows sinus rhythm, left axis deviation, possible left atrial enlargement, T-wave inversion in anterolateral leads suggestive of ischemia( no prior EKG to compare)   Holter monitoring done recently showed predominant sinus rhythm with occasional PACs and PVCs without significant arrhythmia   echocardiogram done this month showed EF more than 60% without regional wall motion abnormality, mild MR    Review of Systems:   review of system negative except as mentioned above    Patient Active Problem List   Diagnosis    Anemia    Impaired fasting glucose    Mild vitamin D deficiency    Primary hypothyroidism    Mucinous adenocarcinoma of colon ([address] )    Pulmonary nodule     Past Medical History:   Diagnosis Date    Endometriosis     Pleurisy     LAST ASSESSED: [id number]     Social History     Socioeconomic History    Marital status: /Civil Union     Spouse name: Not on file    Number of children: Not on file    Years of education: Not on file    Highest education level: Not on file   Occupational History    Not on file   Social Needs    Financial resource strain: Not on file    Food insecurity     Worry: Not on file     Inability: Not on file   [employer] needs     Medical: Not on file     Non-medical: Not on file   Tobacco Use    Smoking status: Never Smoker    Smokeless tobacco: Never Used   Substance and Sexual Activity    Alcohol use: No    Drug use: No    Sexual activity: Yes     Partners: Male   Lifestyle    Physical activity     Days per week: 7 days     Minutes per session: 30 min    Stress: Not at all   Relationships    Social connections     Talks on phone: Not on file     Gets together: Not on file     Attends Rastafarian service: Not on file     Active member of club or organization: Not on file     Attends meetings of clubs or organizations: Not on file     Relationship status: Not on file    Intimate partner violence     Fear of current or ex partner: Not on file     Emotionally abused: Not on file     Physically abused: Not on file     Forced sexual activity: Not on file   Other Topics Concern    Not on file   Social History Narrative    Not on file      Family History   Problem Relation Age of Onset    Hypertension Mother     Hypertension Father      Past Surgical History:   Procedure Laterality Date    DILATION AND CURETTAGE OF UTERUS      LAST ASSESSED: [id number]    TONSILLECTOMY         Current Outpatient Medications:     Cats Claw, Uncaria tomentosa, 350 MG CAPS, daily  , Disp: , Rfl:     cholecalciferol (VITAMIN D3) 1,000 units tablet, Take by mouth, Disp: , Rfl:     Cholecalciferol (VITAMIN D3) 5000 units CAPS, Take by mouth, Disp: , Rfl:     ergocalciferol (ERGOCALCIFEROL) 87518 units capsule, Take 50,000 Units by mouth, Disp: , Rfl:     Kqwmrm-CjXba-SpSypk-FA-Omega (MULTIVITAMIN/MINERALS PO), daily  , Disp: , Rfl:   Allergies   Allergen Reactions    Medical Tape      Denies this allergies     Vitals:    08/25/20 0853   BP: 112/80   BP Location: Left arm   Patient Position: Sitting   Cuff Size: Standard   Pulse: 72   Resp: 18   Temp: 98 7 °F (37 1 °C)   SpO2: 99%   Weight: 72 6 kg (160 lb)   Height: 5' 6" (1 676 m)         Labs:  Appointment on 05/26/2020   Component Date Value    SARS-CoV-2 Ab, IgG 05/26/2020 Negative    Appointment on 04/01/2020   Component Date Value    OCCULT BLD, FECAL IMMUNO* 04/01/2020 Negative    Appointment on 03/31/2020   Component Date Value    WBC 03/31/2020 5 41     RBC 03/31/2020 4 85     Hemoglobin 03/31/2020 14 6     Hematocrit 03/31/2020 45 1     MCV 03/31/2020 93     MCH 03/31/2020 30 1     MCHC 03/31/2020 32 4     RDW 03/31/2020 13 6     MPV 03/31/2020 11 5     Platelets 88/84/2578 322     nRBC 03/31/2020 0     Neutrophils Relative 03/31/2020 57     Immat GRANS % 03/31/2020 0     Lymphocytes Relative 03/31/2020 30     Monocytes Relative 03/31/2020 8     Eosinophils Relative 03/31/2020 4     Basophils Relative 03/31/2020 1     Neutrophils Absolute 03/31/2020 3 08     Immature Grans Absolute 03/31/2020 0 01     Lymphocytes Absolute 03/31/2020 1 60     Monocytes Absolute 03/31/2020 0 43     Eosinophils Absolute 03/31/2020 0 22     Basophils Absolute 03/31/2020 0 07     Sodium 03/31/2020 141     Potassium 03/31/2020 3 9     Chloride 03/31/2020 107     CO2 03/31/2020 31     ANION GAP 03/31/2020 3*    BUN 03/31/2020 12     Creatinine 03/31/2020 0 78     Glucose, Fasting 03/31/2020 87     Calcium 03/31/2020 9 6     AST 03/31/2020 14     ALT 03/31/2020 21     Alkaline Phosphatase 03/31/2020 85     Total Protein 03/31/2020 7 5     Albumin 03/31/2020 4 2     Total Bilirubin 03/31/2020 0 74     eGFR 03/31/2020 82     Cholesterol 03/31/2020 171     Triglycerides 03/31/2020 82     HDL, Direct 03/31/2020 73     LDL Calculated 03/31/2020 82     Non-HDL-Chol (CHOL-HDL) 03/31/2020 98     Hemoglobin A1C 03/31/2020 5 4     EAG 03/31/2020 108     TSH 3RD GENERATON 03/31/2020 6 200*    Vit D, 25-Hydroxy 03/31/2020 33 7     Iron Saturation 03/31/2020 21     TIBC 03/31/2020 445     Iron 03/31/2020 92     Ferritin 03/31/2020 45     Free T4 03/31/2020 0 84      Imaging: No results found  Physical Exam:  General:   thin, awake, alert and oriented x3, not in distress  Neck:no JVD  Eyes: PERRL, conjunctiva normal  Lungs:  Bilateral air entry positive, no wheeze/rhonchi or crackle  Heart:  S1-S2 normal, no murmur  Abdomen:  Soft ,nondistended ,nontender, bowel sounds positive  Extremities:  No leg edema, no deformity, ROM normal  Neuro:  Moving all extremities, speech clear  Skin: warm, no rash    /80 (BP Location: Left arm, Patient Position: Sitting, Cuff Size: Standard)   Pulse 72   Temp 98 7 °F (37 1 °C)   Resp 18   Ht 5' 6" (1 676 m)   Wt 72 6 kg (160 lb)   SpO2 99%   BMI 25 82 kg/m²           Assessment:    1  Left precordial chest pain    Intermediate probability of angina  2   Palpitation  Never had syncope  3  Abnormal EKG  4  History of colon cancer status post surgery in 2016 currently in remission  5  OCD    Recommendations:   1 week of life event monitoring to evaluate for arrhythmia   patient with above symptoms including abnormal EKG would benefit from evaluation of coronary artery disease    Exercise MPI stress test for further evaluation   patient advised to start taking baby aspirin  Risk and benefit discussed with the patient  Patient understands and would start taking it   advised to call 911 if chest pain or shortness of breath at rest or worsening of symptoms   return to clinic in 3 months   above all discussed with patient    Patient understands and agrees

## 2020-09-01 ENCOUNTER — CLINICAL SUPPORT (OUTPATIENT)
Dept: CARDIOLOGY CLINIC | Facility: CLINIC | Age: 65
End: 2020-09-01

## 2020-09-01 DIAGNOSIS — I48.91 ATRIAL FIBRILLATION, UNSPECIFIED TYPE (HCC): Primary | ICD-10-CM

## 2020-09-14 ENCOUNTER — TELEPHONE (OUTPATIENT)
Dept: CARDIOLOGY CLINIC | Facility: CLINIC | Age: 65
End: 2020-09-14

## 2020-09-14 ENCOUNTER — CLINICAL SUPPORT (OUTPATIENT)
Dept: CARDIOLOGY CLINIC | Facility: CLINIC | Age: 65
End: 2020-09-14
Payer: COMMERCIAL

## 2020-09-14 DIAGNOSIS — R07.2 PRECORDIAL PAIN: ICD-10-CM

## 2020-09-14 DIAGNOSIS — R00.2 PALPITATION: ICD-10-CM

## 2020-09-14 PROCEDURE — 93228 REMOTE 30 DAY ECG REV/REPORT: CPT | Performed by: INTERNAL MEDICINE

## 2020-09-14 NOTE — TELEPHONE ENCOUNTER
----- Message from Yony Peterson MD sent at 9/14/2020 10:59 AM EDT -----  Pls call the patient and inform her that no significant findings on event monitor

## 2020-09-15 ENCOUNTER — OFFICE VISIT (OUTPATIENT)
Dept: INTERNAL MEDICINE CLINIC | Facility: CLINIC | Age: 65
End: 2020-09-15
Payer: COMMERCIAL

## 2020-09-15 ENCOUNTER — TELEPHONE (OUTPATIENT)
Dept: CARDIOLOGY CLINIC | Facility: CLINIC | Age: 65
End: 2020-09-15

## 2020-09-15 ENCOUNTER — HOSPITAL ENCOUNTER (OUTPATIENT)
Dept: NON INVASIVE DIAGNOSTICS | Facility: CLINIC | Age: 65
Discharge: HOME | End: 2020-09-15
Payer: COMMERCIAL

## 2020-09-15 VITALS
DIASTOLIC BLOOD PRESSURE: 76 MMHG | HEIGHT: 66 IN | RESPIRATION RATE: 12 BRPM | BODY MASS INDEX: 26.1 KG/M2 | WEIGHT: 162.4 LBS | HEART RATE: 80 BPM | SYSTOLIC BLOOD PRESSURE: 128 MMHG | TEMPERATURE: 97.7 F

## 2020-09-15 DIAGNOSIS — R00.2 PALPITATIONS: Primary | ICD-10-CM

## 2020-09-15 DIAGNOSIS — R00.2 PALPITATION: Primary | ICD-10-CM

## 2020-09-15 DIAGNOSIS — R00.2 PALPITATION: ICD-10-CM

## 2020-09-15 DIAGNOSIS — R07.2 PRECORDIAL PAIN: ICD-10-CM

## 2020-09-15 LAB
MAX DIASTOLIC BP: 106 MMHG
MAX HEART RATE: 155 BPM
MAX PREDICTED HEART RATE: 157 BPM
MAX. SYSTOLIC BP: 234 MMHG
PROTOCOL NAME: NORMAL
TARGET HR FORMULA: NORMAL
TEST INDICATION: NORMAL
TIME IN EXERCISE PHASE: NORMAL

## 2020-09-15 PROCEDURE — 99213 OFFICE O/P EST LOW 20 MIN: CPT | Performed by: NURSE PRACTITIONER

## 2020-09-15 PROCEDURE — A9502 TC99M TETROFOSMIN: HCPCS

## 2020-09-15 PROCEDURE — 1036F TOBACCO NON-USER: CPT | Performed by: NURSE PRACTITIONER

## 2020-09-15 PROCEDURE — 93016 CV STRESS TEST SUPVJ ONLY: CPT | Performed by: INTERNAL MEDICINE

## 2020-09-15 PROCEDURE — 78452 HT MUSCLE IMAGE SPECT MULT: CPT

## 2020-09-15 PROCEDURE — G1004 CDSM NDSC: HCPCS

## 2020-09-15 PROCEDURE — 93018 CV STRESS TEST I&R ONLY: CPT | Performed by: INTERNAL MEDICINE

## 2020-09-15 PROCEDURE — 93017 CV STRESS TEST TRACING ONLY: CPT

## 2020-09-15 PROCEDURE — 78452 HT MUSCLE IMAGE SPECT MULT: CPT | Performed by: INTERNAL MEDICINE

## 2020-09-15 NOTE — PROGRESS NOTES
Assessment/Plan:    Patient Instructions    Palpitation with abnormal stress test patient was evaluated by Cardiology and suggested to take metoprolol 25 mg twice a day  Patient tends to be very anxious and very medication naive in general   I reassured her that the medication is appropriate both for anxiety, her palpitations and her abnormal stress test   I did suggest that possibly we could change it to a once a day and I have communicated that to Cardiology and respect their input  I will contact her after I have heard from them  In the meantime we discussed the importance of stress reduction including distraction exercise acupuncture yoga etc  She will monitor blood pressures and heart rates closely when she initiates medication  Continue baby aspirin per Cardiology         There are no diagnoses linked to this encounter  Subjective:      Patient ID: Feliberto Zarate is a 63 y o  female     Patient had been experiencing palpitations  She recently saw cardiology had a nuclear stress test   He had called to reviewed the information and had suggested that she start metoprolol  She takes a homeopathic approach to her health care, and is concerned about taking this medication she is known to be very anxious has history of OCD anxiety and panic disorder she thinks that this may be could contributing to her borderline elevated blood pressure  She admits that when she gets stressed out she feels the palpitations if she has too much caffeine or poor sleep she feels palpitations  She is just here for reassurance  Current Outpatient Medications:     Cats Claw, Uncaria tomentosa, 350 MG CAPS, daily  , Disp: , Rfl:     cholecalciferol (VITAMIN D3) 1,000 units tablet, Take by mouth, Disp: , Rfl:     metoprolol tartrate (LOPRESSOR) 25 mg tablet, Take 1 tablet (25 mg total) by mouth every 12 (twelve) hours, Disp: 120 tablet, Rfl: 3    Gkeuli-LgDth-FgPtek-FA-Omega (MULTIVITAMIN/MINERALS PO), daily  , Disp: , Rfl:     Cholecalciferol (VITAMIN D3) 5000 units CAPS, Take by mouth, Disp: , Rfl:     ergocalciferol (ERGOCALCIFEROL) 10828 units capsule, Take 50,000 Units by mouth, Disp: , Rfl:     Recent Results (from the past 1008 hour(s))   Stress strip    Collection Time: 09/15/20  9:14 AM   Result Value Ref Range    Protocol Name KJ     Time In Exercise Phase 00:08:00     MAX  SYSTOLIC  mmHg    Max Diastolic Bp 052 mmHg    Max Heart Rate 155 BPM    Max Predicted Heart Rate 157 BPM    Reason for Termination       Target Heart Rate Achieved  Exaggerated BP increase      Test Indication Precordial Pain     Target Hr Formular (220 - Age)*85%     Arrhy During Ex      ECG Interp Before Ex      ECG Interp during Ex      Ex Summary Comment      Overall Hr Response To Exercise      Overall BP Response To Exercise         The following portions of the patient's history were reviewed and updated as appropriate: allergies, current medications, past family history, past medical history, past social history, past surgical history and problem list      Review of Systems   Constitutional: Negative for appetite change, chills, diaphoresis, fatigue, fever and unexpected weight change  HENT: Negative for postnasal drip and sneezing  Eyes: Negative for visual disturbance  Respiratory: Negative for chest tightness and shortness of breath  Cardiovascular: Positive for palpitations  Negative for chest pain and leg swelling  Gastrointestinal: Negative for abdominal pain and blood in stool  Endocrine: Negative for cold intolerance, heat intolerance, polydipsia, polyphagia and polyuria  Genitourinary: Negative for difficulty urinating, dysuria, frequency and urgency  Musculoskeletal: Negative for arthralgias and myalgias  Skin: Negative for rash and wound  Neurological: Negative for dizziness, weakness, light-headedness and headaches  Hematological: Negative for adenopathy     Psychiatric/Behavioral: Negative for confusion, dysphoric mood and sleep disturbance  The patient is not nervous/anxious  Objective:      /76 (BP Location: Left arm, Patient Position: Sitting, Cuff Size: Standard)   Pulse 80   Temp 97 7 °F (36 5 °C) (Temporal)   Resp 12   Ht 5' 6" (1 676 m)   Wt 73 7 kg (162 lb 6 4 oz)   BMI 26 21 kg/m²        Physical Exam  Constitutional:       General: She is not in acute distress  Appearance: She is well-developed  She is not diaphoretic  HENT:      Head: Normocephalic and atraumatic  Nose: Nose normal    Eyes:      Conjunctiva/sclera: Conjunctivae normal       Pupils: Pupils are equal, round, and reactive to light  Neck:      Musculoskeletal: Normal range of motion and neck supple  Thyroid: No thyromegaly  Vascular: No JVD  Trachea: No tracheal deviation  Cardiovascular:      Rate and Rhythm: Normal rate and regular rhythm  Heart sounds: Normal heart sounds  No murmur  No friction rub  No gallop  Pulmonary:      Effort: Pulmonary effort is normal  No respiratory distress  Breath sounds: Normal breath sounds  No wheezing or rales  Abdominal:      General: Bowel sounds are normal  There is no distension  Palpations: Abdomen is soft  Tenderness: There is no abdominal tenderness  Musculoskeletal: Normal range of motion  Lymphadenopathy:      Cervical: No cervical adenopathy  Skin:     General: Skin is warm and dry  Findings: No rash  Neurological:      Mental Status: She is alert and oriented to person, place, and time  Cranial Nerves: No cranial nerve deficit  Psychiatric:         Behavior: Behavior normal          Thought Content:  Thought content normal          Judgment: Judgment normal

## 2020-09-15 NOTE — TELEPHONE ENCOUNTER
----- Message from Julien Garrett MD sent at 9/15/2020  3:07 PM EDT -----  I reviewed stress test result  Patient's cardiac event monitoring is pending  Exercise stress test showed hypertensive response to exercise and mild defect on MPI  I discuss stress test result including limitation of stress test   Patient at present time denies any chest pain or shortness of breath at rest or on exertion  Her palpitations are better  I will start her on low-dose metoprolol for anti anginal including for palpitation(metoprolol ordered)  Indication and side effect of metoprolol discussed with patient  Patient is willing to start  She was advised to continue baby aspirin  Patient was advised to monitor blood pressure at home and bring results on next visit  She was advised to call 911 or office if her symptoms persist    Please make follow-up appointment with me in 2 months  Above all discussed with patient    Patient understands and agrees

## 2020-09-15 NOTE — PATIENT INSTRUCTIONS
Palpitation with abnormal stress test patient was evaluated by Cardiology and suggested to take metoprolol 25 mg twice a day  Patient tends to be very anxious and very medication naive in general   I reassured her that the medication is appropriate both for anxiety, her palpitations and her abnormal stress test   I did suggest that possibly we could change it to a once a day and I have communicated that to Cardiology and respect their input  I will contact her after I have heard from them  In the meantime we discussed the importance of stress reduction including distraction exercise acupuncture yoga etc  She will monitor blood pressures and heart rates closely when she initiates medication    Continue baby aspirin per Cardiology

## 2020-09-17 ENCOUNTER — TELEPHONE (OUTPATIENT)
Dept: INTERNAL MEDICINE CLINIC | Facility: CLINIC | Age: 65
End: 2020-09-17

## 2020-09-17 DIAGNOSIS — R00.2 PALPITATIONS: Primary | ICD-10-CM

## 2020-09-17 RX ORDER — METOPROLOL SUCCINATE 25 MG/1
25 TABLET, EXTENDED RELEASE ORAL DAILY
Qty: 30 TABLET | Refills: 5 | Status: SHIPPED | OUTPATIENT
Start: 2020-09-17 | End: 2022-08-01 | Stop reason: CLARIF

## 2020-10-27 ENCOUNTER — LAB (OUTPATIENT)
Dept: LAB | Facility: CLINIC | Age: 65
End: 2020-10-27
Payer: COMMERCIAL

## 2020-10-27 DIAGNOSIS — E03.9 PRIMARY HYPOTHYROIDISM: ICD-10-CM

## 2020-10-27 DIAGNOSIS — D50.9 IRON DEFICIENCY ANEMIA, UNSPECIFIED IRON DEFICIENCY ANEMIA TYPE: ICD-10-CM

## 2020-10-27 DIAGNOSIS — R73.01 IMPAIRED FASTING GLUCOSE: ICD-10-CM

## 2020-10-27 LAB
ALBUMIN SERPL BCP-MCNC: 3.9 G/DL (ref 3.5–5)
ALP SERPL-CCNC: 94 U/L (ref 46–116)
ALT SERPL W P-5'-P-CCNC: 24 U/L (ref 12–78)
ANION GAP SERPL CALCULATED.3IONS-SCNC: 4 MMOL/L (ref 4–13)
AST SERPL W P-5'-P-CCNC: 11 U/L (ref 5–45)
BASOPHILS # BLD AUTO: 0.09 THOUSANDS/ΜL (ref 0–0.1)
BASOPHILS NFR BLD AUTO: 1 % (ref 0–1)
BILIRUB SERPL-MCNC: 0.31 MG/DL (ref 0.2–1)
BUN SERPL-MCNC: 14 MG/DL (ref 5–25)
CALCIUM SERPL-MCNC: 9.1 MG/DL (ref 8.3–10.1)
CHLORIDE SERPL-SCNC: 107 MMOL/L (ref 100–108)
CHOLEST SERPL-MCNC: 184 MG/DL (ref 50–200)
CO2 SERPL-SCNC: 28 MMOL/L (ref 21–32)
CREAT SERPL-MCNC: 0.73 MG/DL (ref 0.6–1.3)
EOSINOPHIL # BLD AUTO: 0.28 THOUSAND/ΜL (ref 0–0.61)
EOSINOPHIL NFR BLD AUTO: 4 % (ref 0–6)
ERYTHROCYTE [DISTWIDTH] IN BLOOD BY AUTOMATED COUNT: 12.8 % (ref 11.6–15.1)
EST. AVERAGE GLUCOSE BLD GHB EST-MCNC: 108 MG/DL
FERRITIN SERPL-MCNC: 33 NG/ML (ref 8–388)
GFR SERPL CREATININE-BSD FRML MDRD: 88 ML/MIN/1.73SQ M
GLUCOSE P FAST SERPL-MCNC: 80 MG/DL (ref 65–99)
HBA1C MFR BLD: 5.4 %
HCT VFR BLD AUTO: 43.2 % (ref 34.8–46.1)
HDLC SERPL-MCNC: 69 MG/DL
HGB BLD-MCNC: 13.8 G/DL (ref 11.5–15.4)
IMM GRANULOCYTES # BLD AUTO: 0.02 THOUSAND/UL (ref 0–0.2)
IMM GRANULOCYTES NFR BLD AUTO: 0 % (ref 0–2)
IRON SATN MFR SERPL: 6 %
IRON SERPL-MCNC: 29 UG/DL (ref 50–170)
LDLC SERPL CALC-MCNC: 89 MG/DL (ref 0–100)
LYMPHOCYTES # BLD AUTO: 1.91 THOUSANDS/ΜL (ref 0.6–4.47)
LYMPHOCYTES NFR BLD AUTO: 30 % (ref 14–44)
MCH RBC QN AUTO: 30.4 PG (ref 26.8–34.3)
MCHC RBC AUTO-ENTMCNC: 31.9 G/DL (ref 31.4–37.4)
MCV RBC AUTO: 95 FL (ref 82–98)
MONOCYTES # BLD AUTO: 0.54 THOUSAND/ΜL (ref 0.17–1.22)
MONOCYTES NFR BLD AUTO: 9 % (ref 4–12)
NEUTROPHILS # BLD AUTO: 3.54 THOUSANDS/ΜL (ref 1.85–7.62)
NEUTS SEG NFR BLD AUTO: 56 % (ref 43–75)
NONHDLC SERPL-MCNC: 115 MG/DL
NRBC BLD AUTO-RTO: 0 /100 WBCS
PLATELET # BLD AUTO: 309 THOUSANDS/UL (ref 149–390)
PMV BLD AUTO: 11.3 FL (ref 8.9–12.7)
POTASSIUM SERPL-SCNC: 4.3 MMOL/L (ref 3.5–5.3)
PROT SERPL-MCNC: 7.4 G/DL (ref 6.4–8.2)
RBC # BLD AUTO: 4.54 MILLION/UL (ref 3.81–5.12)
SODIUM SERPL-SCNC: 139 MMOL/L (ref 136–145)
TIBC SERPL-MCNC: 451 UG/DL (ref 250–450)
TRIGL SERPL-MCNC: 130 MG/DL
TSH SERPL DL<=0.05 MIU/L-ACNC: 3.35 UIU/ML (ref 0.36–3.74)
WBC # BLD AUTO: 6.38 THOUSAND/UL (ref 4.31–10.16)

## 2020-10-27 PROCEDURE — 80061 LIPID PANEL: CPT

## 2020-10-27 PROCEDURE — 80053 COMPREHEN METABOLIC PANEL: CPT

## 2020-10-27 PROCEDURE — 82728 ASSAY OF FERRITIN: CPT

## 2020-10-27 PROCEDURE — 83540 ASSAY OF IRON: CPT

## 2020-10-27 PROCEDURE — 83036 HEMOGLOBIN GLYCOSYLATED A1C: CPT

## 2020-10-27 PROCEDURE — 84443 ASSAY THYROID STIM HORMONE: CPT

## 2020-10-27 PROCEDURE — 83550 IRON BINDING TEST: CPT

## 2020-10-27 PROCEDURE — 36415 COLL VENOUS BLD VENIPUNCTURE: CPT

## 2020-10-27 PROCEDURE — 85025 COMPLETE CBC W/AUTO DIFF WBC: CPT

## 2020-11-03 ENCOUNTER — OFFICE VISIT (OUTPATIENT)
Dept: INTERNAL MEDICINE CLINIC | Facility: CLINIC | Age: 65
End: 2020-11-03
Payer: COMMERCIAL

## 2020-11-03 VITALS
HEART RATE: 64 BPM | BODY MASS INDEX: 26.13 KG/M2 | HEIGHT: 66 IN | SYSTOLIC BLOOD PRESSURE: 116 MMHG | TEMPERATURE: 97.5 F | DIASTOLIC BLOOD PRESSURE: 78 MMHG | RESPIRATION RATE: 14 BRPM | WEIGHT: 162.6 LBS

## 2020-11-03 DIAGNOSIS — F41.9 ANXIETY: ICD-10-CM

## 2020-11-03 DIAGNOSIS — D50.9 IRON DEFICIENCY ANEMIA, UNSPECIFIED IRON DEFICIENCY ANEMIA TYPE: ICD-10-CM

## 2020-11-03 DIAGNOSIS — Z13.6 SCREENING FOR CARDIOVASCULAR CONDITION: ICD-10-CM

## 2020-11-03 DIAGNOSIS — F42.9 OBSESSIVE-COMPULSIVE DISORDER, UNSPECIFIED TYPE: ICD-10-CM

## 2020-11-03 DIAGNOSIS — R73.01 IMPAIRED FASTING GLUCOSE: ICD-10-CM

## 2020-11-03 DIAGNOSIS — C18.9 MUCINOUS ADENOCARCINOMA OF COLON (HCC): Primary | ICD-10-CM

## 2020-11-03 DIAGNOSIS — R91.1 PULMONARY NODULE: ICD-10-CM

## 2020-11-03 DIAGNOSIS — E03.9 PRIMARY HYPOTHYROIDISM: ICD-10-CM

## 2020-11-03 DIAGNOSIS — E55.9 MILD VITAMIN D DEFICIENCY: ICD-10-CM

## 2020-11-03 PROCEDURE — 99214 OFFICE O/P EST MOD 30 MIN: CPT | Performed by: NURSE PRACTITIONER

## 2020-11-03 PROCEDURE — 3725F SCREEN DEPRESSION PERFORMED: CPT | Performed by: NURSE PRACTITIONER

## 2020-11-03 PROCEDURE — 1036F TOBACCO NON-USER: CPT | Performed by: NURSE PRACTITIONER

## 2020-11-03 PROCEDURE — 3008F BODY MASS INDEX DOCD: CPT | Performed by: NURSE PRACTITIONER

## 2020-11-10 ENCOUNTER — TELEPHONE (OUTPATIENT)
Dept: INTERNAL MEDICINE CLINIC | Facility: CLINIC | Age: 65
End: 2020-11-10

## 2020-11-10 ENCOUNTER — APPOINTMENT (OUTPATIENT)
Dept: LAB | Facility: CLINIC | Age: 65
End: 2020-11-10
Payer: COMMERCIAL

## 2020-11-10 DIAGNOSIS — D50.9 IRON DEFICIENCY ANEMIA, UNSPECIFIED IRON DEFICIENCY ANEMIA TYPE: ICD-10-CM

## 2020-11-10 LAB — HEMOCCULT STL QL IA: NEGATIVE

## 2020-11-10 PROCEDURE — G0328 FECAL BLOOD SCRN IMMUNOASSAY: HCPCS

## 2020-11-11 ENCOUNTER — TELEPHONE (OUTPATIENT)
Dept: INTERNAL MEDICINE CLINIC | Facility: CLINIC | Age: 65
End: 2020-11-11

## 2020-12-04 ENCOUNTER — TELEPHONE (OUTPATIENT)
Dept: INTERNAL MEDICINE CLINIC | Facility: CLINIC | Age: 65
End: 2020-12-04

## 2020-12-04 DIAGNOSIS — E53.8 VITAMIN B 12 DEFICIENCY: ICD-10-CM

## 2020-12-04 DIAGNOSIS — D50.9 IRON DEFICIENCY ANEMIA, UNSPECIFIED IRON DEFICIENCY ANEMIA TYPE: Primary | ICD-10-CM

## 2020-12-08 ENCOUNTER — LAB (OUTPATIENT)
Dept: LAB | Facility: CLINIC | Age: 65
End: 2020-12-08
Payer: COMMERCIAL

## 2020-12-08 DIAGNOSIS — E53.8 VITAMIN B 12 DEFICIENCY: ICD-10-CM

## 2020-12-08 DIAGNOSIS — D50.9 IRON DEFICIENCY ANEMIA, UNSPECIFIED IRON DEFICIENCY ANEMIA TYPE: ICD-10-CM

## 2020-12-08 LAB
FERRITIN SERPL-MCNC: 40 NG/ML (ref 8–388)
FOLATE SERPL-MCNC: >20 NG/ML (ref 3.1–17.5)
IRON SATN MFR SERPL: 16 %
IRON SERPL-MCNC: 70 UG/DL (ref 50–170)
TIBC SERPL-MCNC: 447 UG/DL (ref 250–450)
VIT B12 SERPL-MCNC: 926 PG/ML (ref 100–900)

## 2020-12-08 PROCEDURE — 82746 ASSAY OF FOLIC ACID SERUM: CPT

## 2020-12-08 PROCEDURE — 83550 IRON BINDING TEST: CPT

## 2020-12-08 PROCEDURE — 83540 ASSAY OF IRON: CPT

## 2020-12-08 PROCEDURE — 82607 VITAMIN B-12: CPT

## 2020-12-08 PROCEDURE — 36415 COLL VENOUS BLD VENIPUNCTURE: CPT

## 2020-12-08 PROCEDURE — 83918 ORGANIC ACIDS TOTAL QUANT: CPT

## 2020-12-08 PROCEDURE — 82728 ASSAY OF FERRITIN: CPT

## 2020-12-11 LAB
METHYLMALONATE SERPL-SCNC: 153 NMOL/L (ref 0–378)
SL AMB DISCLAIMER: NORMAL

## 2020-12-14 ENCOUNTER — TELEPHONE (OUTPATIENT)
Dept: INTERNAL MEDICINE CLINIC | Facility: CLINIC | Age: 65
End: 2020-12-14

## 2021-05-11 ENCOUNTER — IMMUNIZATIONS (OUTPATIENT)
Dept: FAMILY MEDICINE CLINIC | Facility: HOSPITAL | Age: 66
End: 2021-05-11

## 2021-05-11 DIAGNOSIS — Z23 ENCOUNTER FOR IMMUNIZATION: Primary | ICD-10-CM

## 2021-05-11 PROCEDURE — 91300 SARS-COV-2 / COVID-19 MRNA VACCINE (PFIZER-BIONTECH) 30 MCG: CPT

## 2021-05-11 PROCEDURE — 0001A SARS-COV-2 / COVID-19 MRNA VACCINE (PFIZER-BIONTECH) 30 MCG: CPT

## 2021-06-03 ENCOUNTER — IMMUNIZATIONS (OUTPATIENT)
Dept: FAMILY MEDICINE CLINIC | Facility: HOSPITAL | Age: 66
End: 2021-06-03

## 2021-06-03 DIAGNOSIS — Z23 ENCOUNTER FOR IMMUNIZATION: Primary | ICD-10-CM

## 2021-06-03 PROCEDURE — 0002A SARS-COV-2 / COVID-19 MRNA VACCINE (PFIZER-BIONTECH) 30 MCG: CPT

## 2021-06-03 PROCEDURE — 91300 SARS-COV-2 / COVID-19 MRNA VACCINE (PFIZER-BIONTECH) 30 MCG: CPT

## 2022-05-12 ENCOUNTER — HOSPITAL ENCOUNTER (OUTPATIENT)
Dept: ULTRASOUND IMAGING | Facility: HOSPITAL | Age: 67
Discharge: HOME | End: 2022-05-12
Payer: COMMERCIAL

## 2022-05-12 DIAGNOSIS — E03.9 HYPOTHYROID: ICD-10-CM

## 2022-05-12 DIAGNOSIS — E04.1 THYROID NODULE: ICD-10-CM

## 2022-05-12 PROCEDURE — 76536 US EXAM OF HEAD AND NECK: CPT

## 2022-07-22 ENCOUNTER — OFFICE VISIT (OUTPATIENT)
Dept: INTERNAL MEDICINE CLINIC | Facility: CLINIC | Age: 67
End: 2022-07-22
Payer: MEDICARE

## 2022-07-22 ENCOUNTER — APPOINTMENT (OUTPATIENT)
Dept: LAB | Facility: CLINIC | Age: 67
End: 2022-07-22
Payer: MEDICARE

## 2022-07-22 VITALS
BODY MASS INDEX: 24.43 KG/M2 | DIASTOLIC BLOOD PRESSURE: 60 MMHG | SYSTOLIC BLOOD PRESSURE: 120 MMHG | TEMPERATURE: 97.5 F | WEIGHT: 152 LBS | HEART RATE: 70 BPM | RESPIRATION RATE: 20 BRPM | HEIGHT: 66 IN | OXYGEN SATURATION: 97 %

## 2022-07-22 DIAGNOSIS — C18.9 MUCINOUS ADENOCARCINOMA OF COLON (HCC): ICD-10-CM

## 2022-07-22 DIAGNOSIS — Z78.0 MENOPAUSE: ICD-10-CM

## 2022-07-22 DIAGNOSIS — Z12.31 ENCOUNTER FOR SCREENING MAMMOGRAM FOR BREAST CANCER: Primary | ICD-10-CM

## 2022-07-22 DIAGNOSIS — E03.9 PRIMARY HYPOTHYROIDISM: ICD-10-CM

## 2022-07-22 DIAGNOSIS — D50.9 IRON DEFICIENCY ANEMIA, UNSPECIFIED IRON DEFICIENCY ANEMIA TYPE: ICD-10-CM

## 2022-07-22 DIAGNOSIS — R73.01 IMPAIRED FASTING GLUCOSE: ICD-10-CM

## 2022-07-22 DIAGNOSIS — Z00.00 WELL ADULT EXAM: ICD-10-CM

## 2022-07-22 DIAGNOSIS — I10 PRIMARY HYPERTENSION: ICD-10-CM

## 2022-07-22 LAB
ALBUMIN SERPL BCP-MCNC: 4.1 G/DL (ref 3.5–5)
ALP SERPL-CCNC: 69 U/L (ref 46–116)
ALT SERPL W P-5'-P-CCNC: 28 U/L (ref 12–78)
ANION GAP SERPL CALCULATED.3IONS-SCNC: 4 MMOL/L (ref 4–13)
AST SERPL W P-5'-P-CCNC: 13 U/L (ref 5–45)
BASOPHILS # BLD AUTO: 0.06 THOUSANDS/ΜL (ref 0–0.1)
BASOPHILS NFR BLD AUTO: 1 % (ref 0–1)
BILIRUB SERPL-MCNC: 0.59 MG/DL (ref 0.2–1)
BUN SERPL-MCNC: 21 MG/DL (ref 5–25)
CALCIUM SERPL-MCNC: 9.4 MG/DL (ref 8.3–10.1)
CEA SERPL-MCNC: 0.7 NG/ML (ref 0–3)
CHLORIDE SERPL-SCNC: 110 MMOL/L (ref 96–108)
CHOLEST SERPL-MCNC: 139 MG/DL
CO2 SERPL-SCNC: 27 MMOL/L (ref 21–32)
CREAT SERPL-MCNC: 0.7 MG/DL (ref 0.6–1.3)
EOSINOPHIL # BLD AUTO: 0.06 THOUSAND/ΜL (ref 0–0.61)
EOSINOPHIL NFR BLD AUTO: 1 % (ref 0–6)
ERYTHROCYTE [DISTWIDTH] IN BLOOD BY AUTOMATED COUNT: 13.3 % (ref 11.6–15.1)
EST. AVERAGE GLUCOSE BLD GHB EST-MCNC: 120 MG/DL
FERRITIN SERPL-MCNC: 200 NG/ML (ref 8–388)
GFR SERPL CREATININE-BSD FRML MDRD: 91 ML/MIN/1.73SQ M
GLUCOSE SERPL-MCNC: 101 MG/DL (ref 65–140)
HBA1C MFR BLD: 5.8 %
HCT VFR BLD AUTO: 43.5 % (ref 34.8–46.1)
HDLC SERPL-MCNC: 63 MG/DL
HGB BLD-MCNC: 14.1 G/DL (ref 11.5–15.4)
IMM GRANULOCYTES # BLD AUTO: 0.02 THOUSAND/UL (ref 0–0.2)
IMM GRANULOCYTES NFR BLD AUTO: 0 % (ref 0–2)
IRON SATN MFR SERPL: 17 % (ref 15–50)
IRON SERPL-MCNC: 73 UG/DL (ref 50–170)
LDLC SERPL CALC-MCNC: 65 MG/DL (ref 0–100)
LYMPHOCYTES # BLD AUTO: 1.56 THOUSANDS/ΜL (ref 0.6–4.47)
LYMPHOCYTES NFR BLD AUTO: 30 % (ref 14–44)
MCH RBC QN AUTO: 30.3 PG (ref 26.8–34.3)
MCHC RBC AUTO-ENTMCNC: 32.4 G/DL (ref 31.4–37.4)
MCV RBC AUTO: 93 FL (ref 82–98)
MONOCYTES # BLD AUTO: 0.47 THOUSAND/ΜL (ref 0.17–1.22)
MONOCYTES NFR BLD AUTO: 9 % (ref 4–12)
NEUTROPHILS # BLD AUTO: 3.06 THOUSANDS/ΜL (ref 1.85–7.62)
NEUTS SEG NFR BLD AUTO: 59 % (ref 43–75)
NONHDLC SERPL-MCNC: 76 MG/DL
NRBC BLD AUTO-RTO: 0 /100 WBCS
PLATELET # BLD AUTO: 254 THOUSANDS/UL (ref 149–390)
PMV BLD AUTO: 12.6 FL (ref 8.9–12.7)
POTASSIUM SERPL-SCNC: 4.3 MMOL/L (ref 3.5–5.3)
PROT SERPL-MCNC: 7.3 G/DL (ref 6.4–8.4)
RBC # BLD AUTO: 4.66 MILLION/UL (ref 3.81–5.12)
SODIUM SERPL-SCNC: 141 MMOL/L (ref 135–147)
TIBC SERPL-MCNC: 434 UG/DL (ref 250–450)
TRIGL SERPL-MCNC: 53 MG/DL
WBC # BLD AUTO: 5.23 THOUSAND/UL (ref 4.31–10.16)

## 2022-07-22 PROCEDURE — 36415 COLL VENOUS BLD VENIPUNCTURE: CPT

## 2022-07-22 PROCEDURE — 83036 HEMOGLOBIN GLYCOSYLATED A1C: CPT

## 2022-07-22 PROCEDURE — 82378 CARCINOEMBRYONIC ANTIGEN: CPT

## 2022-07-22 PROCEDURE — 80061 LIPID PANEL: CPT

## 2022-07-22 PROCEDURE — 80053 COMPREHEN METABOLIC PANEL: CPT

## 2022-07-22 PROCEDURE — 83540 ASSAY OF IRON: CPT

## 2022-07-22 PROCEDURE — 85025 COMPLETE CBC W/AUTO DIFF WBC: CPT

## 2022-07-22 PROCEDURE — 82728 ASSAY OF FERRITIN: CPT

## 2022-07-22 PROCEDURE — 99214 OFFICE O/P EST MOD 30 MIN: CPT | Performed by: INTERNAL MEDICINE

## 2022-07-22 PROCEDURE — 83550 IRON BINDING TEST: CPT

## 2022-07-22 NOTE — PROGRESS NOTES
Assessment/Plan:    Diagnoses and all orders for this visit:    Encounter for screening mammogram for breast cancer    Mucinous adenocarcinoma of colon ([facility] [address] )  -     Occult Blood, Fecal Immunochemical; Future  -     Iron Panel (Includes Ferritin, Iron Sat%, Iron, and TIBC); Future  -     CBC and differential; Future  -     Comprehensive metabolic panel; Future  -     CEA; Future    Impaired fasting glucose  -     Hemoglobin A1C; Future    Primary hypothyroidism    Iron deficiency anemia, unspecified iron deficiency anemia type  -     Occult Blood, Fecal Immunochemical; Future  -     Iron Panel (Includes Ferritin, Iron Sat%, Iron, and TIBC); Future    Menopause  -     DXA bone density spine hip and pelvis; Future    Well adult exam  -     Lipid panel; Future    Primary hypertension            Patient Instructions   History of colon cancer-will check CEA, repeat iron panel, occult blood, CBC and follow accordingly  I would prefer exhausting oral iron options prior to resorting to intravenous if possible  Impaired fasting glucose-check A1c    Reported history of hypothyroidism-TSH and thyroid antibodies normal in May without medical treatment  Blood pressure stable on Toprol      Subjective:      Patient ID: Janice Burnette is a 74 y o  female    Patient presents with3 concern for iron deficiency anemia based upon lab work that was done May 11th in [place]  She notes history of mucinous adenocarcinoma of the colon diagnosed in 2016 status post resection at that time with 0 of 18 lymph nodes positive  She has been followed closely and CEA level had initially been elevated at 3 1 but has been normal with subsequent follow-ups  She had CT scan chest abdomen and pelvis last October negative for any evidence of recurrence    She has had systemic candidiasis treated by an immunologist in [place] with a combination MD/holistic process whereby she received various infusions including Diflucan then supplemental oral nystatin  Notably, she became vegan in 2016  Vitamin S67 and folic acid levels were normal in May  Iron and ferritin levels were 75 and 19 respectively, in the normal range but TIBC was elevated at 485 and iron saturation was 15  There was no CBC with those labs but she did have CBC and October 2021 with hemoglobin 15 3, hematocrit elevated at 45 6 and MCV 89 8  No melanic stool  She was given an oral iron supplement to take but has not been taking it with concerns for liver toxicity  She did receive 1 IV infusion last week  She is unclear how many mg  Current Outpatient Medications:     Cholecalciferol (VITAMIN D3) 5000 units CAPS, Take by mouth, Disp: , Rfl:     ergocalciferol (ERGOCALCIFEROL) 31947 units capsule, Take 50,000 Units by mouth, Disp: , Rfl:     Cats Claw, Uncaria tomentosa, 350 MG CAPS, daily  (Patient not taking: Reported on 7/22/2022), Disp: , Rfl:     cholecalciferol (VITAMIN D3) 1,000 units tablet, Take by mouth (Patient not taking: Reported on 7/22/2022), Disp: , Rfl:     metoprolol succinate (Toprol XL) 25 mg 24 hr tablet, Take 1 tablet (25 mg total) by mouth daily (Patient not taking: Reported on 7/22/2022), Disp: 30 tablet, Rfl: 5    Llrtga-ZnVrz-GlFjfw-FA-Omega (MULTIVITAMIN/MINERALS PO), daily  (Patient not taking: Reported on 7/22/2022), Disp: , Rfl:     No results found for this or any previous visit (from the past 1008 hour(s))  The following portions of the patient's history were reviewed and updated as appropriate: allergies, current medications, past family history, past medical history, past social history, past surgical history and problem list      Review of Systems   Constitutional: Negative for appetite change, chills, diaphoresis, fatigue, fever and unexpected weight change  HENT: Negative for congestion, hearing loss and rhinorrhea  Eyes: Negative for visual disturbance     Respiratory: Negative for cough, chest tightness, shortness of breath and wheezing  Cardiovascular: Negative for chest pain, palpitations and leg swelling  Gastrointestinal: Negative for abdominal pain and blood in stool  Endocrine: Negative for cold intolerance, heat intolerance, polydipsia and polyuria  Genitourinary: Negative for difficulty urinating, dysuria, frequency and urgency  Musculoskeletal: Negative for arthralgias and myalgias  Skin: Negative for rash  Neurological: Negative for dizziness, weakness, light-headedness and headaches  Hematological: Does not bruise/bleed easily  Psychiatric/Behavioral: Negative for dysphoric mood and sleep disturbance  Objective:      Vitals:    07/22/22 1048   BP: 120/60   Pulse: 70   Resp: 20   Temp: 97 5 °F (36 4 °C)   SpO2: 97%          Physical Exam  Constitutional:       Appearance: She is well-developed  HENT:      Head: Normocephalic and atraumatic  Nose: Nose normal    Eyes:      General: No scleral icterus  Conjunctiva/sclera: Conjunctivae normal       Pupils: Pupils are equal, round, and reactive to light  Neck:      Thyroid: No thyromegaly  Vascular: No JVD  Trachea: No tracheal deviation  Cardiovascular:      Rate and Rhythm: Normal rate and regular rhythm  Heart sounds: No murmur heard  No friction rub  No gallop  Pulmonary:      Effort: Pulmonary effort is normal  No respiratory distress  Breath sounds: Normal breath sounds  No wheezing or rales  Abdominal:      General: Bowel sounds are normal  There is no distension  Palpations: Abdomen is soft  There is no mass  Tenderness: There is no abdominal tenderness  There is no guarding or rebound  Musculoskeletal:         General: No tenderness  Cervical back: Normal range of motion and neck supple  Lymphadenopathy:      Cervical: No cervical adenopathy  Skin:     General: Skin is warm and dry  Findings: No erythema or rash     Neurological:      Mental Status: She is alert and oriented to person, place, and time  Cranial Nerves: No cranial nerve deficit  Psychiatric:         Behavior: Behavior normal          Thought Content:  Thought content normal          Judgment: Judgment normal

## 2022-07-22 NOTE — PATIENT INSTRUCTIONS
History of colon cancer-will check CEA, repeat iron panel, occult blood, CBC and follow accordingly  I would prefer exhausting oral iron options prior to resorting to intravenous if possible  Impaired fasting glucose-check A1c    Reported history of hypothyroidism-TSH and thyroid antibodies normal in May without medical treatment      Blood pressure stable on Toprol

## 2022-07-23 DIAGNOSIS — D50.9 IRON DEFICIENCY ANEMIA, UNSPECIFIED IRON DEFICIENCY ANEMIA TYPE: ICD-10-CM

## 2022-07-23 DIAGNOSIS — E03.9 PRIMARY HYPOTHYROIDISM: Primary | ICD-10-CM

## 2022-07-23 DIAGNOSIS — R73.01 IMPAIRED FASTING GLUCOSE: ICD-10-CM

## 2022-07-25 ENCOUNTER — APPOINTMENT (OUTPATIENT)
Dept: LAB | Facility: CLINIC | Age: 67
End: 2022-07-25
Payer: MEDICARE

## 2022-07-25 ENCOUNTER — TELEPHONE (OUTPATIENT)
Dept: INTERNAL MEDICINE CLINIC | Facility: CLINIC | Age: 67
End: 2022-07-25

## 2022-07-25 DIAGNOSIS — D50.9 IRON DEFICIENCY ANEMIA, UNSPECIFIED IRON DEFICIENCY ANEMIA TYPE: ICD-10-CM

## 2022-07-25 DIAGNOSIS — C18.9 MUCINOUS ADENOCARCINOMA OF COLON (HCC): ICD-10-CM

## 2022-07-25 LAB — HEMOCCULT STL QL IA: NEGATIVE

## 2022-07-25 PROCEDURE — G0328 FECAL BLOOD SCRN IMMUNOASSAY: HCPCS

## 2022-07-25 NOTE — TELEPHONE ENCOUNTER
----- Message from Aditi Mueller MD sent at 7/23/2022  7:29 AM EDT -----  Notify-no evidence of iron deficiency, no anemia, labs all fine, except A1c is slightly elevated, watch simple carbs  Repeat labs and f/u in 6 mos

## 2022-07-26 ENCOUNTER — TELEPHONE (OUTPATIENT)
Dept: INTERNAL MEDICINE CLINIC | Facility: CLINIC | Age: 67
End: 2022-07-26

## 2022-07-26 NOTE — TELEPHONE ENCOUNTER
----- Message from Jairo Blanc MD sent at 7/26/2022  5:17 AM EDT -----  Notifyt-stool negative for blood

## 2022-08-01 ENCOUNTER — APPOINTMENT (EMERGENCY)
Dept: CT IMAGING | Facility: HOSPITAL | Age: 67
DRG: 603 | End: 2022-08-01
Payer: MEDICARE

## 2022-08-01 ENCOUNTER — HOSPITAL ENCOUNTER (INPATIENT)
Facility: HOSPITAL | Age: 67
LOS: 3 days | Discharge: HOME | DRG: 603 | End: 2022-08-04
Attending: EMERGENCY MEDICINE | Admitting: SURGERY
Payer: MEDICARE

## 2022-08-01 ENCOUNTER — OFFICE VISIT (OUTPATIENT)
Dept: INTERNAL MEDICINE CLINIC | Facility: CLINIC | Age: 67
End: 2022-08-01
Payer: MEDICARE

## 2022-08-01 VITALS
RESPIRATION RATE: 20 BRPM | SYSTOLIC BLOOD PRESSURE: 116 MMHG | OXYGEN SATURATION: 98 % | HEART RATE: 78 BPM | BODY MASS INDEX: 24.59 KG/M2 | TEMPERATURE: 97.7 F | HEIGHT: 66 IN | WEIGHT: 153 LBS | DIASTOLIC BLOOD PRESSURE: 66 MMHG

## 2022-08-01 DIAGNOSIS — M60.9 MYOSITIS: ICD-10-CM

## 2022-08-01 DIAGNOSIS — L03.90 CELLULITIS: Primary | ICD-10-CM

## 2022-08-01 DIAGNOSIS — T63.304S SPIDER BITE WOUND, UNDETERMINED INTENT, SEQUELA: ICD-10-CM

## 2022-08-01 DIAGNOSIS — L03.90 CELLULITIS, UNSPECIFIED CELLULITIS SITE: ICD-10-CM

## 2022-08-01 DIAGNOSIS — B37.9 CANDIDA INFECTION: Primary | ICD-10-CM

## 2022-08-01 DIAGNOSIS — L03.313 CELLULITIS OF CHEST WALL: ICD-10-CM

## 2022-08-01 LAB
ALBUMIN SERPL BCP-MCNC: 3.8 G/DL (ref 3.5–5)
ALP SERPL-CCNC: 71 U/L (ref 46–116)
ALT SERPL W P-5'-P-CCNC: 31 U/L (ref 12–78)
ANION GAP SERPL CALCULATED.3IONS-SCNC: 8 MMOL/L (ref 4–13)
APTT PPP: 37 SECONDS (ref 23–37)
AST SERPL W P-5'-P-CCNC: 15 U/L (ref 5–45)
BASOPHILS # BLD AUTO: 0.03 THOUSANDS/ΜL (ref 0–0.1)
BASOPHILS NFR BLD AUTO: 1 % (ref 0–1)
BILIRUB SERPL-MCNC: 0.49 MG/DL (ref 0.2–1)
BUN SERPL-MCNC: 10 MG/DL (ref 5–25)
CALCIUM SERPL-MCNC: 10.1 MG/DL (ref 8.3–10.1)
CHLORIDE SERPL-SCNC: 105 MMOL/L (ref 96–108)
CO2 SERPL-SCNC: 29 MMOL/L (ref 21–32)
CREAT SERPL-MCNC: 0.66 MG/DL (ref 0.6–1.3)
EOSINOPHIL # BLD AUTO: 0.07 THOUSAND/ΜL (ref 0–0.61)
EOSINOPHIL NFR BLD AUTO: 1 % (ref 0–6)
ERYTHROCYTE [DISTWIDTH] IN BLOOD BY AUTOMATED COUNT: 13.8 % (ref 11.6–15.1)
GFR SERPL CREATININE-BSD FRML MDRD: 92 ML/MIN/1.73SQ M
GLUCOSE SERPL-MCNC: 105 MG/DL (ref 65–140)
HCT VFR BLD AUTO: 43 % (ref 34.8–46.1)
HGB BLD-MCNC: 13.7 G/DL (ref 11.5–15.4)
IMM GRANULOCYTES # BLD AUTO: 0.02 THOUSAND/UL (ref 0–0.2)
IMM GRANULOCYTES NFR BLD AUTO: 0 % (ref 0–2)
INR PPP: 1.06 (ref 0.84–1.19)
LACTATE SERPL-SCNC: 1 MMOL/L (ref 0.5–2)
LYMPHOCYTES # BLD AUTO: 1.04 THOUSANDS/ΜL (ref 0.6–4.47)
LYMPHOCYTES NFR BLD AUTO: 20 % (ref 14–44)
MCH RBC QN AUTO: 29.7 PG (ref 26.8–34.3)
MCHC RBC AUTO-ENTMCNC: 31.9 G/DL (ref 31.4–37.4)
MCV RBC AUTO: 93 FL (ref 82–98)
MONOCYTES # BLD AUTO: 0.52 THOUSAND/ΜL (ref 0.17–1.22)
MONOCYTES NFR BLD AUTO: 10 % (ref 4–12)
NEUTROPHILS # BLD AUTO: 3.65 THOUSANDS/ΜL (ref 1.85–7.62)
NEUTS SEG NFR BLD AUTO: 68 % (ref 43–75)
NRBC BLD AUTO-RTO: 0 /100 WBCS
PLATELET # BLD AUTO: 210 THOUSANDS/UL (ref 149–390)
PLATELET # BLD AUTO: 228 THOUSANDS/UL (ref 149–390)
PMV BLD AUTO: 11.7 FL (ref 8.9–12.7)
PMV BLD AUTO: 11.9 FL (ref 8.9–12.7)
POTASSIUM SERPL-SCNC: 4.1 MMOL/L (ref 3.5–5.3)
PROT SERPL-MCNC: 8.1 G/DL (ref 6.4–8.4)
PROTHROMBIN TIME: 13.6 SECONDS (ref 11.6–14.5)
RBC # BLD AUTO: 4.61 MILLION/UL (ref 3.81–5.12)
SODIUM SERPL-SCNC: 142 MMOL/L (ref 135–147)
WBC # BLD AUTO: 5.33 THOUSAND/UL (ref 4.31–10.16)

## 2022-08-01 PROCEDURE — 74177 CT ABD & PELVIS W/CONTRAST: CPT

## 2022-08-01 PROCEDURE — 99284 EMERGENCY DEPT VISIT MOD MDM: CPT

## 2022-08-01 PROCEDURE — 71260 CT THORAX DX C+: CPT

## 2022-08-01 PROCEDURE — 87077 CULTURE AEROBIC IDENTIFY: CPT | Performed by: EMERGENCY MEDICINE

## 2022-08-01 PROCEDURE — 36415 COLL VENOUS BLD VENIPUNCTURE: CPT

## 2022-08-01 PROCEDURE — 96365 THER/PROPH/DIAG IV INF INIT: CPT

## 2022-08-01 PROCEDURE — 85730 THROMBOPLASTIN TIME PARTIAL: CPT | Performed by: EMERGENCY MEDICINE

## 2022-08-01 PROCEDURE — 85025 COMPLETE CBC W/AUTO DIFF WBC: CPT | Performed by: EMERGENCY MEDICINE

## 2022-08-01 PROCEDURE — 87154 CUL TYP ID BLD PTHGN 6+ TRGT: CPT | Performed by: EMERGENCY MEDICINE

## 2022-08-01 PROCEDURE — 99285 EMERGENCY DEPT VISIT HI MDM: CPT | Performed by: PHYSICIAN ASSISTANT

## 2022-08-01 PROCEDURE — 83605 ASSAY OF LACTIC ACID: CPT | Performed by: EMERGENCY MEDICINE

## 2022-08-01 PROCEDURE — 80053 COMPREHEN METABOLIC PANEL: CPT | Performed by: EMERGENCY MEDICINE

## 2022-08-01 PROCEDURE — 85049 AUTOMATED PLATELET COUNT: CPT | Performed by: SURGERY

## 2022-08-01 PROCEDURE — 96368 THER/DIAG CONCURRENT INF: CPT

## 2022-08-01 PROCEDURE — 87040 BLOOD CULTURE FOR BACTERIA: CPT | Performed by: EMERGENCY MEDICINE

## 2022-08-01 PROCEDURE — 96367 TX/PROPH/DG ADDL SEQ IV INF: CPT

## 2022-08-01 PROCEDURE — 85610 PROTHROMBIN TIME: CPT | Performed by: EMERGENCY MEDICINE

## 2022-08-01 PROCEDURE — 99214 OFFICE O/P EST MOD 30 MIN: CPT | Performed by: NURSE PRACTITIONER

## 2022-08-01 RX ORDER — DEXTROSE, SODIUM CHLORIDE, AND POTASSIUM CHLORIDE 5; .45; .15 G/100ML; G/100ML; G/100ML
50 INJECTION INTRAVENOUS CONTINUOUS
Status: DISCONTINUED | OUTPATIENT
Start: 2022-08-01 | End: 2022-08-02

## 2022-08-01 RX ORDER — PANTOPRAZOLE SODIUM 40 MG/10ML
40 INJECTION, POWDER, LYOPHILIZED, FOR SOLUTION INTRAVENOUS
Status: DISCONTINUED | OUTPATIENT
Start: 2022-08-02 | End: 2022-08-04 | Stop reason: HOSPADM

## 2022-08-01 RX ORDER — ONDANSETRON 2 MG/ML
4 INJECTION INTRAMUSCULAR; INTRAVENOUS EVERY 6 HOURS PRN
Status: DISCONTINUED | OUTPATIENT
Start: 2022-08-01 | End: 2022-08-04 | Stop reason: HOSPADM

## 2022-08-01 RX ORDER — NYSTATIN 500000 [USP'U]/1
1 TABLET, COATED ORAL DAILY
Qty: 90 TABLET | Refills: 0
Start: 2022-08-01 | End: 2022-08-31

## 2022-08-01 RX ORDER — CLINDAMYCIN PHOSPHATE 600 MG/50ML
600 INJECTION INTRAVENOUS ONCE
Status: COMPLETED | OUTPATIENT
Start: 2022-08-01 | End: 2022-08-01

## 2022-08-01 RX ORDER — VANCOMYCIN HYDROCHLORIDE 1 G/200ML
15 INJECTION, SOLUTION INTRAVENOUS ONCE
Status: COMPLETED | OUTPATIENT
Start: 2022-08-01 | End: 2022-08-01

## 2022-08-01 RX ORDER — HEPARIN SODIUM 5000 [USP'U]/ML
5000 INJECTION, SOLUTION INTRAVENOUS; SUBCUTANEOUS EVERY 8 HOURS SCHEDULED
Status: DISCONTINUED | OUTPATIENT
Start: 2022-08-01 | End: 2022-08-04 | Stop reason: HOSPADM

## 2022-08-01 RX ADMIN — PIPERACILLIN AND TAZOBACTAM 3.38 G: 36; 4.5 INJECTION, POWDER, FOR SOLUTION INTRAVENOUS at 17:00

## 2022-08-01 RX ADMIN — CLINDAMYCIN PHOSPHATE 600 MG: 600 INJECTION, SOLUTION INTRAVENOUS at 16:31

## 2022-08-01 RX ADMIN — PIPERACILLIN AND TAZOBACTAM 3.38 G: 36; 4.5 INJECTION, POWDER, FOR SOLUTION INTRAVENOUS at 20:33

## 2022-08-01 RX ADMIN — VANCOMYCIN HYDROCHLORIDE 1000 MG: 1 INJECTION, SOLUTION INTRAVENOUS at 17:34

## 2022-08-01 RX ADMIN — DEXTROSE, SODIUM CHLORIDE, AND POTASSIUM CHLORIDE 50 ML/HR: 5; .45; .15 INJECTION INTRAVENOUS at 21:24

## 2022-08-01 RX ADMIN — HEPARIN SODIUM 5000 UNITS: 5000 INJECTION INTRAVENOUS; SUBCUTANEOUS at 23:37

## 2022-08-01 RX ADMIN — IOHEXOL 65 ML: 350 INJECTION, SOLUTION INTRAVENOUS at 16:24

## 2022-08-01 NOTE — ED PROVIDER NOTES
History  Chief Complaint   Patient presents with    Spider Bite     Pt states she was bitten by a spider on Friday and was sent by Dr Kalani Cifuentes for further eval      Lin Ventura is a 74year-old female with pmhx including colon cancer s/p surgical resection and hypothyroidism arriving to the emergency department by recommendation of her primary care provider for further management of cellulitis of the right chest wall, possibly secondary to a spider bite  The patient states that she was gardening on Friday when she experienced a presumed insect bite to the right chest wall  She notes that she was wearing a long sleeve turtleneck at that time  She states that there was initially a localized area of blistering and redness which she had apply tea tree oil to, noting that the next day the blisters had opened and drained yellow fluid  She states that she has developed progressive redness and warmth of the skin surrounding the wound stating that this has increased significantly from time of onset  She denies that this rash is painful but states that the area is itchy  She denies fevers though admits to chills  She states that she did not visualize the spider that bit her but noticed a spider web near her garden with a small black spider with a spotted white back  She denies chest pain or shortness of breath, nausea or episodes of vomiting  She states that she is currently taking nystatin for a candidal infection  She offers no other complaints or concerns at this time  Prior to Admission Medications   Prescriptions Last Dose Informant Patient Reported? Taking?    Cholecalciferol (VITAMIN D3) 5000 units CAPS   Yes No   Sig: Take by mouth   ergocalciferol (ERGOCALCIFEROL) 37082 units capsule   Yes No   Sig: Take 50,000 Units by mouth   nystatin (MYCOSTATIN) 500,000 units tablet   No No   Sig: Take 1 tablet (500,000 Units total) by mouth in the morning      Facility-Administered Medications: None Past Medical History:   Diagnosis Date    Endometriosis     Pleurisy     LAST ASSESSED: [id number]       Past Surgical History:   Procedure Laterality Date    DILATION AND CURETTAGE OF UTERUS      LAST ASSESSED: [id number]    TONSILLECTOMY         Family History   Problem Relation Age of Onset    Hypertension Mother     Hypertension Father      I have reviewed and agree with the history as documented  E-Cigarette/Vaping    E-Cigarette Use Never User      E-Cigarette/Vaping Substances    Nicotine No     THC No     CBD No     Flavoring No     Other No     Unknown No      Social History     Tobacco Use    Smoking status: Never Smoker    Smokeless tobacco: Never Used   Vaping Use    Vaping Use: Never used   Substance Use Topics    Alcohol use: No    Drug use: No       Review of Systems   Constitutional: Positive for appetite change  Negative for fatigue and fever  Respiratory: Negative for shortness of breath  Cardiovascular: Negative for chest pain  Gastrointestinal: Negative for abdominal pain, nausea and vomiting  Skin: Positive for rash  Neurological: Negative for dizziness, weakness and light-headedness  All other systems reviewed and are negative  Physical Exam  Physical Exam  Vitals and nursing note reviewed  Constitutional:       General: She is not in acute distress  Appearance: Normal appearance  She is well-developed  She is not ill-appearing, toxic-appearing or diaphoretic  HENT:      Head: Normocephalic and atraumatic  Right Ear: External ear normal       Left Ear: External ear normal    Eyes:      Conjunctiva/sclera: Conjunctivae normal    Cardiovascular:      Rate and Rhythm: Normal rate and regular rhythm  Pulses: Normal pulses  Pulmonary:      Effort: Pulmonary effort is normal  No respiratory distress  Breath sounds: Normal breath sounds  No wheezing, rhonchi or rales  Chest:      Chest wall: No tenderness     Abdominal:      General: There is no distension  Palpations: Abdomen is soft  Tenderness: There is no abdominal tenderness  There is no guarding or rebound  Musculoskeletal:         General: Normal range of motion  Cervical back: Normal range of motion and neck supple  Skin:     General: Skin is warm and dry  Capillary Refill: Capillary refill takes less than 2 seconds  Findings: Rash present  Comments: Extensive cellulitis surrounding the wound which was marked with a skin marker  Photo posted in media tab   Neurological:      Mental Status: She is alert  Motor: Motor function is intact  No weakness  Gait: Gait is intact     Psychiatric:         Mood and Affect: Mood normal                     Vital Signs  ED Triage Vitals [08/01/22 1245]   Temperature Pulse Respirations Blood Pressure SpO2   98 °F (36 7 °C) 83 16 133/63 99 %      Temp Source Heart Rate Source Patient Position - Orthostatic VS BP Location FiO2 (%)   Oral Monitor Sitting Left arm --      Pain Score       8           Vitals:    08/01/22 1245 08/01/22 1700 08/01/22 1800 08/01/22 1830   BP: 133/63 139/81 126/63 121/64   Pulse: 83 62 59 61   Patient Position - Orthostatic VS: Sitting Sitting Sitting Sitting         Visual Acuity      ED Medications  Medications   dextrose 5 % and sodium chloride 0 45 % with KCl 20 mEq/L infusion (has no administration in time range)   ondansetron (ZOFRAN) injection 4 mg (has no administration in time range)   heparin (porcine) subcutaneous injection 5,000 Units (has no administration in time range)   piperacillin-tazobactam (ZOSYN) 3 375 g in sodium chloride 0 9 % 100 mL IVPB (has no administration in time range)   pantoprazole (PROTONIX) injection 40 mg (has no administration in time range)   morphine injection 2 mg (has no administration in time range)   piperacillin-tazobactam (ZOSYN) 3 375 g in sodium chloride 0 9 % 100 mL IVPB (0 g Intravenous Stopped 8/1/22 9915)   vancomycin (VANCOCIN) IVPB (premix in dextrose) 1,000 mg 200 mL (0 mg/kg × 69 4 kg Intravenous Stopped 8/1/22 1838)   clindamycin (CLEOCIN) IVPB (premix in dextrose) 600 mg 50 mL (0 mg Intravenous Stopped 8/1/22 1733)   iohexol (OMNIPAQUE) 350 MG/ML injection (SINGLE-DOSE) 65 mL (65 mL Intravenous Given 8/1/22 1624)       Diagnostic Studies  Results Reviewed     Procedure Component Value Units Date/Time    Platelet count [[id number]]     Lab Status: No result Specimen: Blood     Blood culture #2 [[id number]] Collected: 08/01/22 1607    Lab Status: In process Specimen: Blood from Arm, Left Updated: 08/01/22 1611    Blood culture #1 [[id number]] Collected: 08/01/22 1607    Lab Status:  In process Specimen: Blood from Arm, Left Updated: 08/01/22 1611    Comprehensive metabolic panel [[id number]] Collected: 08/01/22 1526    Lab Status: Final result Specimen: Blood from Arm, Left Updated: 08/01/22 1602     Sodium 142 mmol/L      Potassium 4 1 mmol/L      Chloride 105 mmol/L      CO2 29 mmol/L      ANION GAP 8 mmol/L      BUN 10 mg/dL      Creatinine 0 66 mg/dL      Glucose 105 mg/dL      Calcium 10 1 mg/dL      AST 15 U/L      ALT 31 U/L      Alkaline Phosphatase 71 U/L      Total Protein 8 1 g/dL      Albumin 3 8 g/dL      Total Bilirubin 0 49 mg/dL      eGFR 92 ml/min/1 73sq m     Narrative:      [place] guidelines for Chronic Kidney Disease (CKD):     Stage 1 with normal or high GFR (GFR > 90 mL/min/1 73 square meters)    Stage 2 Mild CKD (GFR = 60-89 mL/min/1 73 square meters)    Stage 3A Moderate CKD (GFR = 45-59 mL/min/1 73 square meters)    Stage 3B Moderate CKD (GFR = 30-44 mL/min/1 73 square meters)    Stage 4 Severe CKD (GFR = 15-29 mL/min/1 73 square meters)    Stage 5 End Stage CKD (GFR <15 mL/min/1 73 square meters)  Note: GFR calculation is accurate only with a steady state creatinine    Lactic acid [[id number]]  (Normal) Collected: 08/01/22 1526    Lab Status: Final result Specimen: Blood from Arm, Left Updated: 08/01/22 1555     LACTIC ACID 1 0 mmol/L     Narrative:      Result may be elevated if tourniquet was used during collection  APTT [[id number]]  (Normal) Collected: 08/01/22 1526    Lab Status: Final result Specimen: Blood from Arm, Left Updated: 08/01/22 1549     PTT 37 seconds     Protime-INR [[id number]]  (Normal) Collected: 08/01/22 1526    Lab Status: Final result Specimen: Blood from Arm, Left Updated: 08/01/22 1549     Protime 13 6 seconds      INR 1 06    CBC and differential [[id number]] Collected: 08/01/22 1526    Lab Status: Final result Specimen: Blood from Arm, Left Updated: 08/01/22 1535     WBC 5 33 Thousand/uL      RBC 4 61 Million/uL      Hemoglobin 13 7 g/dL      Hematocrit 43 0 %      MCV 93 fL      MCH 29 7 pg      MCHC 31 9 g/dL      RDW 13 8 %      MPV 11 7 fL      Platelets 117 Thousands/uL      nRBC 0 /100 WBCs      Neutrophils Relative 68 %      Immat GRANS % 0 %      Lymphocytes Relative 20 %      Monocytes Relative 10 %      Eosinophils Relative 1 %      Basophils Relative 1 %      Neutrophils Absolute 3 65 Thousands/µL      Immature Grans Absolute 0 02 Thousand/uL      Lymphocytes Absolute 1 04 Thousands/µL      Monocytes Absolute 0 52 Thousand/µL      Eosinophils Absolute 0 07 Thousand/µL      Basophils Absolute 0 03 Thousands/µL                  CT chest abdomen pelvis w contrast   Final Result by Sangeetha Moon MD (08/01 1831)      Findings most compatible with right lateral chest wall cellulitis and subjacent myositis  No abscess  Solitary enlarged right axillary lymph node is likely reactive  Numerous tiny pulmonary nodules, likely benign, given history of malignancy follow-up is recommended  Otherwise no acute findings in the chest, abdomen or pelvis  Workstation performed: [id number]                    Procedures  Procedures         ED Course  ED Course as of 08/01/22 2002   Mon Aug 01, 2022   [id number] Dr Maggy Winchester paged to review     1953 Spoke to Dr Renzo Castellanos, General Surgery attending on call, who accepts patient to his service  Bridging orders placed  SBIRT 22yo+    Flowsheet Row Most Recent Value   SBIRT (23 yo +)    In order to provide better care to our patients, we are screening all of our patients for alcohol and drug use  Would it be okay to ask you these screening questions? Yes Filed at: 08/01/2022 1609   Initial Alcohol Screen: US AUDIT-C     1  How often do you have a drink containing alcohol? 0 Filed at: 08/01/2022 1609   2  How many drinks containing alcohol do you have on a typical day you are drinking? 0 Filed at: 08/01/2022 1609   3a  Male UNDER 65: How often do you have five or more drinks on one occasion? 0 Filed at: 08/01/2022 1609   Audit-C Score 0 Filed at: 08/01/2022 1609   MIRIAM: How many times in the past year have you    Used an illegal drug or used a prescription medication for non-medical reasons? Never Filed at: 08/01/2022 1609                    MDM  Number of Diagnoses or Management Options  Cellulitis: new and requires workup  Myositis: new and requires workup  Diagnosis management comments: This is a 42-year-old female presenting the emergency department for evaluation and treatment of extensive cellulitis involving the right chest wall  Patient reports possible spider bite on Friday while gardening, reporting a central area of blistering and drainage, stating that over the past couple of days she has had worsening surrounding redness and warmth of the skin  Admits to chills as well  Differential diagnosis includes but is not limited to: cellulitis, nsti, infected insect bite, spider bite, costochondritis; imaging to assess for acute intra-thoracic or intra-abdominal process    Initial ED plan: labs, imaging, IV abx, admission    Final ED Assessment: Vital signs stable on ED presentation, examination as above   All labs and imaging independently reviewed with imaging interpreted by the Radiologist   There were no significant lab findings leukocytosis or lactic acidosis  CT chest/abdomen/pelvis reports findings compatible with right lateral chest wall cellulitis and subjacent myositis, no abscess  Patient started on broad-spectrum antibiotics  She does not meet criteria for sepsis  The case was discussed with Dr Elle Alberts, general surgery attending on-call, who accepts patient to his service for further management  States that patient will be evaluated in the morning by the General Surgery Service  Testing results reviewed with the patient and spouse at bedside as well as my discussion with General surgery as above, and she is understanding and agreeable with admission plan  Bridging orders placed  Patient stable for admission  Amount and/or Complexity of Data Reviewed  Clinical lab tests: ordered and reviewed  Tests in the radiology section of CPT®: ordered and reviewed  Review and summarize past medical records: yes  Discuss the patient with other providers: yes  Independent visualization of images, tracings, or specimens: yes    Risk of Complications, Morbidity, and/or Mortality  Presenting problems: moderate  Diagnostic procedures: moderate  Management options: moderate    Patient Progress  Patient progress: stable      Disposition  Final diagnoses:   Cellulitis   Myositis     Time reflects when diagnosis was documented in both MDM as applicable and the Disposition within this note     Time User Action Codes Description Comment    8/1/2022  7:43 PM Media Sensor Add [L03 90] Cellulitis     8/1/2022  7:43 PM Hedy Maza Add [M60 9] Myositis       ED Disposition     None      Follow-up Information    None         Patient's Medications   Discharge Prescriptions    No medications on file       No discharge procedures on file      PDMP Review     None          ED Provider  Electronically Signed by           Cris Thorpe PA-C  08/01/22 2036

## 2022-08-01 NOTE — ED NOTES
Patient transported to [facility], [address] [place] [address]  08/01/22 0615

## 2022-08-01 NOTE — PROGRESS NOTES
INTERNAL MEDICINE FOLLOW-UP VISIT  [facility]    NAME: Kavita Ozuna  AGE: 74 y o  SEX: female  : 1955     DATE: 2022     Assessment and Plan:   1  Candida infection  - nystatin (MYCOSTATIN) 500,000 units tablet; Take 1 tablet (500,000 Units total) by mouth in the morning  Dispense: 90 tablet; Refill: 0    2  Cellulitis, unspecified cellulitis site      3  Spider bite wound, undetermined intent, sequela  See media for pictures large area area of cellulitis and necrotic tissue recommend ER for evaluation and treatment she agrees        Depression Screening and Follow-up Plan: Patient was screened for depression during today's encounter  They screened negative with a PHQ-2 score of 0  No follow-ups on file  Chief Complaint:     Chief Complaint   Patient presents with    Insect Bite     Patient reports Friday she had a spider bite on right side, redness, peeling, itchiness, blistering, pain scale is currently at 7-8, cant sleep fully during the night, used titre oil and had a cotton dressing, at night cleaned the area, yellow puss reported from broken blisters, leaking recently stopped post 2 days      History of Present Illness:      on Friday she was bit by something in the garden she is pretty sure it was a spider, at 1st it started as a red raised rash then it quickly spread and turned into multiple blisters  She put tea tree oil on it and each day it just got progressively worse  She has chills no fevers positive nausea    The following portions of the patient's history were reviewed and updated as appropriate: allergies, current medications, past family history, past medical history, past social history, past surgical history and problem list      Review of Systems:     Review of Systems   Constitutional: Positive for chills  Negative for appetite change, diaphoresis, fatigue, fever and unexpected weight change     HENT: Negative for postnasal drip and sneezing  Eyes: Negative for visual disturbance  Respiratory: Negative for chest tightness and shortness of breath  Cardiovascular: Negative for chest pain, palpitations and leg swelling  Gastrointestinal: Negative for abdominal pain and blood in stool  Endocrine: Negative for cold intolerance, heat intolerance, polydipsia, polyphagia and polyuria  Genitourinary: Negative for difficulty urinating, dysuria, frequency and urgency  Musculoskeletal: Positive for arthralgias  Negative for myalgias  Skin: Positive for wound  Negative for rash  See image   Neurological: Negative for dizziness, weakness, light-headedness and headaches  Hematological: Negative for adenopathy  Psychiatric/Behavioral: Negative for confusion, dysphoric mood and sleep disturbance  The patient is not nervous/anxious  Past Medical History:     Past Medical History:   Diagnosis Date    Endometriosis     Pleurisy     LAST ASSESSED: [id number]        Current Medications:     Current Outpatient Medications:     Cholecalciferol (VITAMIN D3) 5000 units CAPS, Take by mouth, Disp: , Rfl:     ergocalciferol (ERGOCALCIFEROL) 28827 units capsule, Take 50,000 Units by mouth, Disp: , Rfl:     nystatin (MYCOSTATIN) 500,000 units tablet, Take 1 tablet (500,000 Units total) by mouth in the morning, Disp: 90 tablet, Rfl: 0     Allergies: Allergies   Allergen Reactions    Medical Tape Swelling     Denies this allergies  bandaids---redness, itchy site        Physical Exam:     /66 (BP Location: Left arm, Patient Position: Sitting, Cuff Size: Standard)   Pulse 78   Temp 97 7 °F (36 5 °C) (Tympanic)   Resp 20   Ht 5' 6" (1 676 m)   Wt 69 4 kg (153 lb)   SpO2 98%   BMI 24 69 kg/m²     Physical Exam  Constitutional:       Appearance: She is well-developed  HENT:      Head: Normocephalic and atraumatic  Eyes:      Pupils: Pupils are equal, round, and reactive to light     Pulmonary: Effort: Pulmonary effort is normal    Skin:     Comments: See attached image   Neurological:      Mental Status: She is alert and oriented to person, place, and time             Data:       BENEDICT Matthew  [facility]

## 2022-08-02 LAB
ALBUMIN SERPL BCP-MCNC: 2.9 G/DL (ref 3.5–5)
ALP SERPL-CCNC: 57 U/L (ref 46–116)
ALT SERPL W P-5'-P-CCNC: 24 U/L (ref 12–78)
ANION GAP SERPL CALCULATED.3IONS-SCNC: 9 MMOL/L (ref 4–13)
AST SERPL W P-5'-P-CCNC: 13 U/L (ref 5–45)
BASOPHILS # BLD AUTO: 0.02 THOUSANDS/ΜL (ref 0–0.1)
BASOPHILS NFR BLD AUTO: 0 % (ref 0–1)
BILIRUB SERPL-MCNC: 0.52 MG/DL (ref 0.2–1)
BUN SERPL-MCNC: 8 MG/DL (ref 5–25)
CALCIUM ALBUM COR SERPL-MCNC: 10.1 MG/DL (ref 8.3–10.1)
CALCIUM SERPL-MCNC: 9.2 MG/DL (ref 8.3–10.1)
CHLORIDE SERPL-SCNC: 105 MMOL/L (ref 96–108)
CO2 SERPL-SCNC: 27 MMOL/L (ref 21–32)
CREAT SERPL-MCNC: 0.66 MG/DL (ref 0.6–1.3)
EOSINOPHIL # BLD AUTO: 0.09 THOUSAND/ΜL (ref 0–0.61)
EOSINOPHIL NFR BLD AUTO: 2 % (ref 0–6)
ERYTHROCYTE [DISTWIDTH] IN BLOOD BY AUTOMATED COUNT: 13.5 % (ref 11.6–15.1)
GFR SERPL CREATININE-BSD FRML MDRD: 92 ML/MIN/1.73SQ M
GLUCOSE SERPL-MCNC: 107 MG/DL (ref 65–140)
HCT VFR BLD AUTO: 36.3 % (ref 34.8–46.1)
HGB BLD-MCNC: 11.9 G/DL (ref 11.5–15.4)
IMM GRANULOCYTES # BLD AUTO: 0.01 THOUSAND/UL (ref 0–0.2)
IMM GRANULOCYTES NFR BLD AUTO: 0 % (ref 0–2)
LYMPHOCYTES # BLD AUTO: 0.98 THOUSANDS/ΜL (ref 0.6–4.47)
LYMPHOCYTES NFR BLD AUTO: 18 % (ref 14–44)
MAGNESIUM SERPL-MCNC: 2.1 MG/DL (ref 1.6–2.6)
MCH RBC QN AUTO: 29.9 PG (ref 26.8–34.3)
MCHC RBC AUTO-ENTMCNC: 32.8 G/DL (ref 31.4–37.4)
MCV RBC AUTO: 91 FL (ref 82–98)
MONOCYTES # BLD AUTO: 0.52 THOUSAND/ΜL (ref 0.17–1.22)
MONOCYTES NFR BLD AUTO: 10 % (ref 4–12)
NEUTROPHILS # BLD AUTO: 3.74 THOUSANDS/ΜL (ref 1.85–7.62)
NEUTS SEG NFR BLD AUTO: 70 % (ref 43–75)
NRBC BLD AUTO-RTO: 0 /100 WBCS
PHOSPHATE SERPL-MCNC: 4 MG/DL (ref 2.3–4.1)
PLATELET # BLD AUTO: 215 THOUSANDS/UL (ref 149–390)
PMV BLD AUTO: 11.5 FL (ref 8.9–12.7)
POTASSIUM SERPL-SCNC: 3.7 MMOL/L (ref 3.5–5.3)
PROT SERPL-MCNC: 6.4 G/DL (ref 6.4–8.4)
RBC # BLD AUTO: 3.98 MILLION/UL (ref 3.81–5.12)
SODIUM SERPL-SCNC: 141 MMOL/L (ref 135–147)
WBC # BLD AUTO: 5.36 THOUSAND/UL (ref 4.31–10.16)

## 2022-08-02 PROCEDURE — 99222 1ST HOSP IP/OBS MODERATE 55: CPT | Performed by: SURGERY

## 2022-08-02 PROCEDURE — 83735 ASSAY OF MAGNESIUM: CPT | Performed by: SURGERY

## 2022-08-02 PROCEDURE — 84100 ASSAY OF PHOSPHORUS: CPT | Performed by: SURGERY

## 2022-08-02 PROCEDURE — 80053 COMPREHEN METABOLIC PANEL: CPT | Performed by: SURGERY

## 2022-08-02 PROCEDURE — C9113 INJ PANTOPRAZOLE SODIUM, VIA: HCPCS | Performed by: SURGERY

## 2022-08-02 PROCEDURE — 85025 COMPLETE CBC W/AUTO DIFF WBC: CPT | Performed by: SURGERY

## 2022-08-02 RX ORDER — OXYCODONE HYDROCHLORIDE 10 MG/1
10 TABLET ORAL EVERY 4 HOURS PRN
Status: DISCONTINUED | OUTPATIENT
Start: 2022-08-02 | End: 2022-08-04 | Stop reason: HOSPADM

## 2022-08-02 RX ORDER — OXYCODONE HYDROCHLORIDE 5 MG/1
5 TABLET ORAL EVERY 4 HOURS PRN
Status: DISCONTINUED | OUTPATIENT
Start: 2022-08-02 | End: 2022-08-04 | Stop reason: HOSPADM

## 2022-08-02 RX ORDER — NYSTATIN 500000 [USP'U]/1
500000 TABLET, COATED ORAL DAILY
Status: DISCONTINUED | OUTPATIENT
Start: 2022-08-02 | End: 2022-08-04 | Stop reason: HOSPADM

## 2022-08-02 RX ADMIN — HEPARIN SODIUM 5000 UNITS: 5000 INJECTION INTRAVENOUS; SUBCUTANEOUS at 21:27

## 2022-08-02 RX ADMIN — PIPERACILLIN AND TAZOBACTAM 3.38 G: 36; 4.5 INJECTION, POWDER, FOR SOLUTION INTRAVENOUS at 21:15

## 2022-08-02 RX ADMIN — HEPARIN SODIUM 5000 UNITS: 5000 INJECTION INTRAVENOUS; SUBCUTANEOUS at 14:53

## 2022-08-02 RX ADMIN — NYSTATIN 500000 UNITS: 500000 TABLET, FILM COATED ORAL at 16:54

## 2022-08-02 RX ADMIN — PIPERACILLIN AND TAZOBACTAM 3.38 G: 36; 4.5 INJECTION, POWDER, FOR SOLUTION INTRAVENOUS at 02:30

## 2022-08-02 RX ADMIN — PIPERACILLIN AND TAZOBACTAM 3.38 G: 36; 4.5 INJECTION, POWDER, FOR SOLUTION INTRAVENOUS at 08:53

## 2022-08-02 RX ADMIN — PIPERACILLIN AND TAZOBACTAM 3.38 G: 36; 4.5 INJECTION, POWDER, FOR SOLUTION INTRAVENOUS at 14:53

## 2022-08-02 RX ADMIN — PANTOPRAZOLE SODIUM 40 MG: 40 INJECTION, POWDER, FOR SOLUTION INTRAVENOUS at 08:58

## 2022-08-02 RX ADMIN — HEPARIN SODIUM 5000 UNITS: 5000 INJECTION INTRAVENOUS; SUBCUTANEOUS at 05:43

## 2022-08-02 NOTE — PLAN OF CARE
Problem: SKIN/TISSUE INTEGRITY - ADULT  Goal: Incision(s), wounds(s) or drain site(s) healing without S/S of infection  Description: INTERVENTIONS  - Assess and document dressing, incision, wound bed, drain sites and surrounding tissue  - Provide patient and family education  - Perform skin care/dressing changes daily   Problem: PAIN - ADULT  Goal: Verbalizes/displays adequate comfort level or baseline comfort level  Description: Interventions:  - Encourage patient to monitor pain and request assistance  - Assess pain using appropriate pain scale  - Administer analgesics based on type and severity of pain and evaluate response  - Implement non-pharmacological measures as appropriate and evaluate response  - Consider cultural and social influences on pain and pain management  - Notify physician/advanced practitioner if interventions unsuccessful or patient reports new pain  Outcome: Progressing     Problem: DISCHARGE PLANNING  Goal: Discharge to home or other facility with appropriate resources  Description: INTERVENTIONS:  - Identify barriers to discharge w/patient and caregiver  - Arrange for needed discharge resources and transportation as appropriate  - Identify discharge learning needs (meds, wound care, etc )  - Arrange for interpretive services to assist at discharge as needed  - Refer to Case Management Department for coordinating discharge planning if the patient needs post-hospital services based on physician/advanced practitioner order or complex needs related to functional status, cognitive ability, or social support system  Outcome: Progressing     Problem: DISCHARGE PLANNING  Goal: Discharge to home or other facility with appropriate resources  Description: INTERVENTIONS:  - Identify barriers to discharge w/patient and caregiver  - Arrange for needed discharge resources and transportation as appropriate  - Identify discharge learning needs (meds, wound care, etc )  - Arrange for interpretive services to assist at discharge as needed  - Refer to Case Management Department for coordinating discharge planning if the patient needs post-hospital services based on physician/advanced practitioner order or complex needs related to functional status, cognitive ability, or social support system  Outcome: Progressing     Problem: Knowledge Deficit  Goal: Patient/family/caregiver demonstrates understanding of disease process, treatment plan, medications, and discharge instructions  Description: Complete learning assessment and assess knowledge base    Interventions:  - Provide teaching at level of understanding  - Provide teaching via preferred learning methods  Outcome: Progressing     Outcome: Progressing

## 2022-08-02 NOTE — PLAN OF CARE
Problem: SAFETY ADULT  Goal: Maintain or return to baseline ADL function  Description: INTERVENTIONS:  -  Assess patient's ability to carry out ADLs; assess patient's baseline for ADL function and identify physical deficits which impact ability to perform ADLs (bathing, care of mouth/teeth, toileting, grooming, dressing, etc )  - Assess/evaluate cause of self-care deficits   - Assess range of motion  - Assess patient's mobility; develop plan if impaired  - Assess patient's need for assistive devices and provide as appropriate  - Encourage maximum independence but intervene and supervise when necessary  - Involve family in performance of ADLs  - Assess for home care needs following discharge   - Consider OT consult to assist with ADL evaluation and planning for discharge  - Provide patient education as appropriate  Outcome: Progressing     Problem: SKIN/TISSUE INTEGRITY - ADULT  Goal: Incision(s), wounds(s) or drain site(s) healing without S/S of infection  Description: INTERVENTIONS  - Assess and document dressing, incision, wound bed, drain sites and surrounding tissue  - Provide patient and family education  - Perform skin care/dressing changes as needed   Outcome: Progressing     Problem: Knowledge Deficit  Goal: Patient/family/caregiver demonstrates understanding of disease process, treatment plan, medications, and discharge instructions  Description: Complete learning assessment and assess knowledge base    Interventions:  - Provide teaching at level of understanding  - Provide teaching via preferred learning methods  Outcome: Progressing

## 2022-08-02 NOTE — NURSING NOTE
Patient education provided regarding use of incentive spirometer and SCD's  Purpose of IS such as lung expansion and airway clearance discussed  Correct way to use IS explained  Per patient she has used in past  Patient instructed to use IS ten times every hour while awake  Education provided regarding use of SCD's  Patient instructed to use while in bed  Patient receptive of teaching, teach back method utilized

## 2022-08-02 NOTE — H&P
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    -cont Zosyn  -cont pain control  -ok to advance diet, gluten free   -cont aqua K heat to area  -daily DSD changes  -cont Nystatin p o  home med  Pt to take her med from home  Chief Complaint I was bitten by a spider in my garden 4 days ago  It became very painful, red and had blisters    HPI: William Palm is a 74y o  year old female  PHM:  Mucinous colon cancer with resection 2016, gluten intolerance, candida infection of the colon, Fe Deficiency Anemia,  Impaired fasting glucose, HTN,    who presents with area of erythema, blisters, superficial cutaneous tissue loss of the right lateral abdominal wall  Pt states that 4 days ago she felt an insect bit on this area  It became red and itchy very quickly, it continued to increase  The area blistered quickly and she felt like there was something under the skin  It became large and firm like a half of a melon  She was having pain with deep breathing of her lateral ribs  Her  insisted she report to the ED when it became more red over a larger area  She states she found spider webs       Imaging Studies: CT chest abdomen pelvis w contrast    Result Date: 8/1/2022  Impression: Findings most compatible with right lateral chest wall cellulitis and subjacent myositis  No abscess  Solitary enlarged right axillary lymph node is likely reactive  Numerous tiny pulmonary nodules, likely benign, given history of malignancy follow-up is recommended   Otherwise no acute findings in the chest, abdomen or pelvis    Workstation performed: [id number]     Lab Results:   CBC with diff:   Lab Results   Component Value Date    WBC 5 36 08/02/2022    HGB 11 9 08/02/2022    HCT 36 3 08/02/2022    MCV 91 08/02/2022     08/02/2022    MCH 29 9 08/02/2022    MCHC 32 8 08/02/2022    RDW 13 5 08/02/2022    MPV 11 5 08/02/2022    NRBC 0 08/02/2022   , BMP/CMP:   Lab Results   Component Value Date    SODIUM 141 08/02/2022    K 3 7 08/02/2022     08/02/2022    CO2 27 08/02/2022    BUN 8 08/02/2022    CREATININE 0 66 08/02/2022    CALCIUM 9 2 08/02/2022    AST 13 08/02/2022    ALT 24 08/02/2022    ALKPHOS 57 08/02/2022    EGFR 92 08/02/2022         Historical Information   Past Medical History:   Diagnosis Date    Endometriosis     Pleurisy     LAST ASSESSED: [id number]     Past Surgical History:   Procedure Laterality Date    DILATION AND CURETTAGE OF UTERUS      LAST ASSESSED: [id number]    TONSILLECTOMY       Social History   Social History     Substance and Sexual Activity   Alcohol Use Never     Social History     Substance and Sexual Activity   Drug Use No     Social History     Tobacco Use   Smoking Status Never Smoker   Smokeless Tobacco Never Used     Family History: no pertinent family history  Allergies   Allergen Reactions    Medical Tape Swelling     Denies this allergies  bandaids---redness, itchy site     Meds/Allergies     current meds:   Current Facility-Administered Medications   Medication Dose Route Frequency    dextrose 5 % and sodium chloride 0 45 % with KCl 20 mEq/L infusion  50 mL/hr Intravenous Continuous    heparin (porcine) subcutaneous injection 5,000 Units  5,000 Units Subcutaneous Q8H [address]    morphine injection 2 mg  2 mg Intravenous Q2H PRN    nystatin (MYCOSTATIN) tablet 500,000 Units  500,000 Units Oral Daily    ondansetron (ZOFRAN) injection 4 mg  4 mg Intravenous Q6H PRN    pantoprazole (PROTONIX) injection 40 mg  40 mg Intravenous Q24H [address]    piperacillin-tazobactam (ZOSYN) 3 375 g in sodium chloride 0 9 % 100 mL IVPB  3 375 g Intravenous Q6H         Objective   Vitals: ,Body mass index is 24 41 kg/m²  Intake/Output Summary (Last 24 hours) at 8/2/2022 0806  Last data filed at 8/2/2022 0601  Gross per 24 hour   Intake 200 ml   Output 200 ml   Net 0 ml     @LDASHORT    @ROS:  12 set ROS reviewed and negative except for:   Per HPI  I am unable to eat any kind of bread or pasta  I take a medication for a fungal infection of my colon  Physical Exam:Blood pressure 113/58, pulse 60, temperature 97 8 °F (36 6 °C), resp  rate 18, height 5' 6" (1 676 m), weight 68 6 kg (151 lb 3 8 oz), SpO2 96 %  General appearance: alert, appears stated age and cooperative  HEENT: PERRLA, EOMI, sclera clear, anicterus  Back: no tenderness,deformity,   Lungs:clear throughout  Heart[de-identified] RRR, S1, S2 normal, no murmur  Abdomen: soft, right flank, with 5 cm area of superficial tissue blistering, necrosis  One area of intact blister with small amount of fluid, the area is dry, no drainage  Large area of erythema with margins marked, small area of recession of erythema from the margin in lower flank  No induration, mild soft tissue edema  Extremities: FROM no joint deformities, motor,sensory intact, pedal edema: none   Media Information                  Document Information    Clinical Image - Mobile Device      08/01/2022 15:57   Attached To:    Hospital Encounter on 8/1/22     Source Information    Kedar Marie PA-C  [facility]       Neurologic: CN II-XII grossly intact, no tremor, affect appropriate        VTE Prophylaxis: Sequential compression device (Venodyne)  and Heparin     Code Status: Level 1 - Full Code  Advance Directive and Living Will:      Power of :    POLST:      Sanchez Sanchez PA-C  8/2/2022

## 2022-08-02 NOTE — PLAN OF CARE
Problem: PAIN - ADULT  Goal: Verbalizes/displays adequate comfort level or baseline comfort level  Description: Interventions:  - Encourage patient to monitor pain and request assistance  - Assess pain using appropriate pain scale  - Administer analgesics based on type and severity of pain and evaluate response  - Implement non-pharmacological measures as appropriate and evaluate response  - Consider cultural and social influences on pain and pain management  - Notify physician/advanced practitioner if interventions unsuccessful or patient reports new pain  Outcome: Progressing     Problem: INFECTION - ADULT  Goal: Absence or prevention of progression during hospitalization  Description: INTERVENTIONS:  - Assess and monitor for signs and symptoms of infection  - Monitor lab/diagnostic results  - Monitor all insertion sites, i e  indwelling lines, tubes, and drains  - Monitor endotracheal if appropriate and nasal secretions for changes in amount and color  - Moran appropriate cooling/warming therapies per order  - Administer medications as ordered  - Instruct and encourage patient and family to use good hand hygiene technique  - Identify and instruct in appropriate isolation precautions for identified infection/condition  Outcome: Progressing

## 2022-08-03 LAB
ANION GAP SERPL CALCULATED.3IONS-SCNC: 7 MMOL/L (ref 4–13)
BASOPHILS # BLD AUTO: 0.03 THOUSANDS/ΜL (ref 0–0.1)
BASOPHILS NFR BLD AUTO: 1 % (ref 0–1)
BUN SERPL-MCNC: 8 MG/DL (ref 5–25)
CALCIUM SERPL-MCNC: 9.7 MG/DL (ref 8.3–10.1)
CHLORIDE SERPL-SCNC: 107 MMOL/L (ref 96–108)
CO2 SERPL-SCNC: 27 MMOL/L (ref 21–32)
CREAT SERPL-MCNC: 0.87 MG/DL (ref 0.6–1.3)
EOSINOPHIL # BLD AUTO: 0.23 THOUSAND/ΜL (ref 0–0.61)
EOSINOPHIL NFR BLD AUTO: 6 % (ref 0–6)
ERYTHROCYTE [DISTWIDTH] IN BLOOD BY AUTOMATED COUNT: 13.6 % (ref 11.6–15.1)
GFR SERPL CREATININE-BSD FRML MDRD: 70 ML/MIN/1.73SQ M
GLUCOSE SERPL-MCNC: 138 MG/DL (ref 65–140)
HCT VFR BLD AUTO: 41.3 % (ref 34.8–46.1)
HGB BLD-MCNC: 13.5 G/DL (ref 11.5–15.4)
IMM GRANULOCYTES # BLD AUTO: 0.03 THOUSAND/UL (ref 0–0.2)
IMM GRANULOCYTES NFR BLD AUTO: 1 % (ref 0–2)
LYMPHOCYTES # BLD AUTO: 1.09 THOUSANDS/ΜL (ref 0.6–4.47)
LYMPHOCYTES NFR BLD AUTO: 29 % (ref 14–44)
MCH RBC QN AUTO: 30 PG (ref 26.8–34.3)
MCHC RBC AUTO-ENTMCNC: 32.7 G/DL (ref 31.4–37.4)
MCV RBC AUTO: 92 FL (ref 82–98)
MONOCYTES # BLD AUTO: 0.33 THOUSAND/ΜL (ref 0.17–1.22)
MONOCYTES NFR BLD AUTO: 9 % (ref 4–12)
NEUTROPHILS # BLD AUTO: 2.1 THOUSANDS/ΜL (ref 1.85–7.62)
NEUTS SEG NFR BLD AUTO: 54 % (ref 43–75)
NRBC BLD AUTO-RTO: 0 /100 WBCS
PLATELET # BLD AUTO: 279 THOUSANDS/UL (ref 149–390)
PMV BLD AUTO: 11 FL (ref 8.9–12.7)
POTASSIUM SERPL-SCNC: 3.9 MMOL/L (ref 3.5–5.3)
RBC # BLD AUTO: 4.5 MILLION/UL (ref 3.81–5.12)
SODIUM SERPL-SCNC: 141 MMOL/L (ref 135–147)
WBC # BLD AUTO: 3.81 THOUSAND/UL (ref 4.31–10.16)

## 2022-08-03 PROCEDURE — 99231 SBSQ HOSP IP/OBS SF/LOW 25: CPT | Performed by: SURGERY

## 2022-08-03 PROCEDURE — 80048 BASIC METABOLIC PNL TOTAL CA: CPT | Performed by: PHYSICIAN ASSISTANT

## 2022-08-03 PROCEDURE — 85025 COMPLETE CBC W/AUTO DIFF WBC: CPT | Performed by: PHYSICIAN ASSISTANT

## 2022-08-03 PROCEDURE — C9113 INJ PANTOPRAZOLE SODIUM, VIA: HCPCS | Performed by: SURGERY

## 2022-08-03 RX ADMIN — HEPARIN SODIUM 5000 UNITS: 5000 INJECTION INTRAVENOUS; SUBCUTANEOUS at 14:31

## 2022-08-03 RX ADMIN — NYSTATIN 500000 UNITS: 500000 TABLET, FILM COATED ORAL at 10:30

## 2022-08-03 RX ADMIN — PIPERACILLIN AND TAZOBACTAM 3.38 G: 36; 4.5 INJECTION, POWDER, FOR SOLUTION INTRAVENOUS at 20:00

## 2022-08-03 RX ADMIN — PANTOPRAZOLE SODIUM 40 MG: 40 INJECTION, POWDER, FOR SOLUTION INTRAVENOUS at 08:23

## 2022-08-03 RX ADMIN — HEPARIN SODIUM 5000 UNITS: 5000 INJECTION INTRAVENOUS; SUBCUTANEOUS at 21:05

## 2022-08-03 RX ADMIN — PIPERACILLIN AND TAZOBACTAM 3.38 G: 36; 4.5 INJECTION, POWDER, FOR SOLUTION INTRAVENOUS at 02:20

## 2022-08-03 RX ADMIN — HEPARIN SODIUM 5000 UNITS: 5000 INJECTION INTRAVENOUS; SUBCUTANEOUS at 05:36

## 2022-08-03 RX ADMIN — PIPERACILLIN AND TAZOBACTAM 3.38 G: 36; 4.5 INJECTION, POWDER, FOR SOLUTION INTRAVENOUS at 14:31

## 2022-08-03 RX ADMIN — PIPERACILLIN AND TAZOBACTAM 3.38 G: 36; 4.5 INJECTION, POWDER, FOR SOLUTION INTRAVENOUS at 08:21

## 2022-08-03 NOTE — PLAN OF CARE
Problem: SKIN/TISSUE INTEGRITY - ADULT  Goal: Incision(s), wounds(s) or drain site(s) healing without S/S of infection  Description: INTERVENTIONS  - Assess and document dressing, incision, wound bed, drain sites and surrounding tissue  - Provide patient and family education  - Perform skin care/dressing changes every day  Outcome: Progressing

## 2022-08-03 NOTE — PROGRESS NOTES
Progress Note - General Surgery   Guy Agudelo 74 y o  female MRN: [id number]  Unit/Bed#: -01 Encounter: [id number]    Assessment/Plan  Guy Agudelo is a 74 y o  female     Right flank cellulitis secondary to suspected spider bite  AVSS, WBC 3 81 this AM  Preliminary blood cultures show no growth   Margins of erythema markedly improved this AM compared to prior marked side, central area of necrosis indurated with scant serous drainage, no evidence of fluctuance or purulent drainage     Continue current care, IV Zosyn for antimicrobial coverage for cellulitis, anticipate transition to PO antibiotics in next 24-48 hours with plan to complete PO course of antibiotics  Warm compresses, Aqua K pad to affected area   Local wound care  Follow up on final blood cultures, preliminary results show no growth  Continue home medications  Diet as tolerated     Subjective/Objective    Subjective: No acute events overnight, pain markedly improved, only notes some itching around area     Objective:     Blood pressure 115/76, pulse 66, temperature 97 6 °F (36 4 °C), resp  rate 20, height 5' 6" (1 676 m), weight 68 6 kg (151 lb 3 8 oz), SpO2 98 %  ,Body mass index is 24 41 kg/m²  Intake/Output Summary (Last 24 hours) at 8/3/2022 0956  Last data filed at 8/3/2022 0908  Gross per 24 hour   Intake 780 ml   Output --   Net 780 ml       Invasive Devices  Report    Peripheral Intravenous Line  Duration           Peripheral IV 08/02/22 Dorsal (posterior); Right Forearm <1 day                Physical Exam: /76   Pulse 66   Temp 97 6 °F (36 4 °C)   Resp 20   Ht 5' 6" (1 676 m)   Wt 68 6 kg (151 lb 3 8 oz)   SpO2 98%   BMI 24 41 kg/m²   General appearance: alert and oriented, in no acute distress  Lungs: clear to auscultation bilaterally  Heart: regular rate and rhythm, S1, S2 normal, no murmur, click, rub or gallop  Abdomen: soft, non-tender; bowel sounds normal; no masses,  no organomegaly  Skin: margins of erythema receeding from prior marked sites following IV antibiotics, central area of necrosis/skin blistering unchanged with scant amount of serous drainage, noted induration around center of lesion, no palpable fluctuance or expressible purulence     Lab, Imaging and other studies:I have personally reviewed pertinent lab results       VTE Pharmacologic Prophylaxis: Heparin  VTE Mechanical Prophylaxis: sequential compression device    Recent Results (from the past 36 hour(s))   Comprehensive metabolic panel    Collection Time: 08/02/22  5:22 AM   Result Value Ref Range    Sodium 141 135 - 147 mmol/L    Potassium 3 7 3 5 - 5 3 mmol/L    Chloride 105 96 - 108 mmol/L    CO2 27 21 - 32 mmol/L    ANION GAP 9 4 - 13 mmol/L    BUN 8 5 - 25 mg/dL    Creatinine 0 66 0 60 - 1 30 mg/dL    Glucose 107 65 - 140 mg/dL    Calcium 9 2 8 3 - 10 1 mg/dL    Corrected Calcium 10 1 8 3 - 10 1 mg/dL    AST 13 5 - 45 U/L    ALT 24 12 - 78 U/L    Alkaline Phosphatase 57 46 - 116 U/L    Total Protein 6 4 6 4 - 8 4 g/dL    Albumin 2 9 (L) 3 5 - 5 0 g/dL    Total Bilirubin 0 52 0 20 - 1 00 mg/dL    eGFR 92 ml/min/1 73sq m   Magnesium    Collection Time: 08/02/22  5:22 AM   Result Value Ref Range    Magnesium 2 1 1 6 - 2 6 mg/dL   Phosphorus    Collection Time: 08/02/22  5:22 AM   Result Value Ref Range    Phosphorus 4 0 2 3 - 4 1 mg/dL   CBC and differential    Collection Time: 08/02/22  5:22 AM   Result Value Ref Range    WBC 5 36 4 31 - 10 16 Thousand/uL    RBC 3 98 3 81 - 5 12 Million/uL    Hemoglobin 11 9 11 5 - 15 4 g/dL    Hematocrit 36 3 34 8 - 46 1 %    MCV 91 82 - 98 fL    MCH 29 9 26 8 - 34 3 pg    MCHC 32 8 31 4 - 37 4 g/dL    RDW 13 5 11 6 - 15 1 %    MPV 11 5 8 9 - 12 7 fL    Platelets 782 916 - 827 Thousands/uL    nRBC 0 /100 WBCs    Neutrophils Relative 70 43 - 75 %    Immat GRANS % 0 0 - 2 %    Lymphocytes Relative 18 14 - 44 %    Monocytes Relative 10 4 - 12 %    Eosinophils Relative 2 0 - 6 %    Basophils Relative 0 0 - 1 % Neutrophils Absolute 3 74 1 85 - 7 62 Thousands/µL    Immature Grans Absolute 0 01 0 00 - 0 20 Thousand/uL    Lymphocytes Absolute 0 98 0 60 - 4 47 Thousands/µL    Monocytes Absolute 0 52 0 17 - 1 22 Thousand/µL    Eosinophils Absolute 0 09 0 00 - 0 61 Thousand/µL    Basophils Absolute 0 02 0 00 - 0 10 Thousands/µL   CBC and differential    Collection Time: 08/03/22  9:17 AM   Result Value Ref Range    WBC 3 81 (L) 4 31 - 10 16 Thousand/uL    RBC 4 50 3 81 - 5 12 Million/uL    Hemoglobin 13 5 11 5 - 15 4 g/dL    Hematocrit 41 3 34 8 - 46 1 %    MCV 92 82 - 98 fL    MCH 30 0 26 8 - 34 3 pg    MCHC 32 7 31 4 - 37 4 g/dL    RDW 13 6 11 6 - 15 1 %    MPV 11 0 8 9 - 12 7 fL    Platelets 180 489 - 046 Thousands/uL    nRBC 0 /100 WBCs    Neutrophils Relative 54 43 - 75 %    Immat GRANS % 1 0 - 2 %    Lymphocytes Relative 29 14 - 44 %    Monocytes Relative 9 4 - 12 %    Eosinophils Relative 6 0 - 6 %    Basophils Relative 1 0 - 1 %    Neutrophils Absolute 2 10 1 85 - 7 62 Thousands/µL    Immature Grans Absolute 0 03 0 00 - 0 20 Thousand/uL    Lymphocytes Absolute 1 09 0 60 - 4 47 Thousands/µL    Monocytes Absolute 0 33 0 17 - 1 22 Thousand/µL    Eosinophils Absolute 0 23 0 00 - 0 61 Thousand/µL    Basophils Absolute 0 03 0 00 - 0 10 Thousands/µL   Basic metabolic panel    Collection Time: 08/03/22  9:17 AM   Result Value Ref Range    Sodium 141 135 - 147 mmol/L    Potassium 3 9 3 5 - 5 3 mmol/L    Chloride 107 96 - 108 mmol/L    CO2 27 21 - 32 mmol/L    ANION GAP 7 4 - 13 mmol/L    BUN 8 5 - 25 mg/dL    Creatinine 0 87 0 60 - 1 30 mg/dL    Glucose 138 65 - 140 mg/dL    Calcium 9 7 8 3 - 10 1 mg/dL    eGFR 70 ml/min/1 73sq m

## 2022-08-04 ENCOUNTER — TRANSITIONAL CARE MANAGEMENT (OUTPATIENT)
Dept: INTERNAL MEDICINE CLINIC | Facility: CLINIC | Age: 67
End: 2022-08-04

## 2022-08-04 VITALS
OXYGEN SATURATION: 97 % | HEIGHT: 66 IN | HEART RATE: 66 BPM | SYSTOLIC BLOOD PRESSURE: 116 MMHG | DIASTOLIC BLOOD PRESSURE: 64 MMHG | RESPIRATION RATE: 20 BRPM | WEIGHT: 151.24 LBS | BODY MASS INDEX: 24.31 KG/M2 | TEMPERATURE: 97.4 F

## 2022-08-04 LAB
ANION GAP SERPL CALCULATED.3IONS-SCNC: 9 MMOL/L (ref 4–13)
BASOPHILS # BLD AUTO: 0.04 THOUSANDS/ΜL (ref 0–0.1)
BASOPHILS NFR BLD AUTO: 1 % (ref 0–1)
BUN SERPL-MCNC: 7 MG/DL (ref 5–25)
CALCIUM SERPL-MCNC: 9.8 MG/DL (ref 8.3–10.1)
CHLORIDE SERPL-SCNC: 106 MMOL/L (ref 96–108)
CO2 SERPL-SCNC: 25 MMOL/L (ref 21–32)
CREAT SERPL-MCNC: 0.77 MG/DL (ref 0.6–1.3)
EOSINOPHIL # BLD AUTO: 0.35 THOUSAND/ΜL (ref 0–0.61)
EOSINOPHIL NFR BLD AUTO: 7 % (ref 0–6)
ERYTHROCYTE [DISTWIDTH] IN BLOOD BY AUTOMATED COUNT: 13.3 % (ref 11.6–15.1)
GFR SERPL CREATININE-BSD FRML MDRD: 81 ML/MIN/1.73SQ M
GLUCOSE SERPL-MCNC: 93 MG/DL (ref 65–140)
HCT VFR BLD AUTO: 41.3 % (ref 34.8–46.1)
HGB BLD-MCNC: 13.4 G/DL (ref 11.5–15.4)
IMM GRANULOCYTES # BLD AUTO: 0.06 THOUSAND/UL (ref 0–0.2)
IMM GRANULOCYTES NFR BLD AUTO: 1 % (ref 0–2)
LYMPHOCYTES # BLD AUTO: 1.78 THOUSANDS/ΜL (ref 0.6–4.47)
LYMPHOCYTES NFR BLD AUTO: 35 % (ref 14–44)
MCH RBC QN AUTO: 29.5 PG (ref 26.8–34.3)
MCHC RBC AUTO-ENTMCNC: 32.4 G/DL (ref 31.4–37.4)
MCV RBC AUTO: 91 FL (ref 82–98)
MONOCYTES # BLD AUTO: 0.43 THOUSAND/ΜL (ref 0.17–1.22)
MONOCYTES NFR BLD AUTO: 9 % (ref 4–12)
NEUTROPHILS # BLD AUTO: 2.38 THOUSANDS/ΜL (ref 1.85–7.62)
NEUTS SEG NFR BLD AUTO: 47 % (ref 43–75)
NRBC BLD AUTO-RTO: 0 /100 WBCS
PLATELET # BLD AUTO: 324 THOUSANDS/UL (ref 149–390)
PMV BLD AUTO: 11.5 FL (ref 8.9–12.7)
POTASSIUM SERPL-SCNC: 4.1 MMOL/L (ref 3.5–5.3)
RBC # BLD AUTO: 4.55 MILLION/UL (ref 3.81–5.12)
SODIUM SERPL-SCNC: 140 MMOL/L (ref 135–147)
WBC # BLD AUTO: 5.04 THOUSAND/UL (ref 4.31–10.16)

## 2022-08-04 PROCEDURE — 85025 COMPLETE CBC W/AUTO DIFF WBC: CPT | Performed by: PHYSICIAN ASSISTANT

## 2022-08-04 PROCEDURE — 80048 BASIC METABOLIC PNL TOTAL CA: CPT | Performed by: PHYSICIAN ASSISTANT

## 2022-08-04 PROCEDURE — 99238 HOSP IP/OBS DSCHRG MGMT 30/<: CPT | Performed by: SURGERY

## 2022-08-04 PROCEDURE — C9113 INJ PANTOPRAZOLE SODIUM, VIA: HCPCS | Performed by: SURGERY

## 2022-08-04 RX ORDER — AMOXICILLIN AND CLAVULANATE POTASSIUM 875; 125 MG/1; MG/1
1 TABLET, FILM COATED ORAL EVERY 12 HOURS SCHEDULED
Qty: 16 TABLET | Refills: 0 | Status: SHIPPED | OUTPATIENT
Start: 2022-08-04 | End: 2022-08-12

## 2022-08-04 RX ADMIN — PANTOPRAZOLE SODIUM 40 MG: 40 INJECTION, POWDER, FOR SOLUTION INTRAVENOUS at 08:18

## 2022-08-04 RX ADMIN — PIPERACILLIN AND TAZOBACTAM 3.38 G: 36; 4.5 INJECTION, POWDER, FOR SOLUTION INTRAVENOUS at 08:18

## 2022-08-04 RX ADMIN — PIPERACILLIN AND TAZOBACTAM 3.38 G: 36; 4.5 INJECTION, POWDER, FOR SOLUTION INTRAVENOUS at 01:45

## 2022-08-04 RX ADMIN — NYSTATIN 500000 UNITS: 500000 TABLET, FILM COATED ORAL at 08:18

## 2022-08-04 RX ADMIN — HEPARIN SODIUM 5000 UNITS: 5000 INJECTION INTRAVENOUS; SUBCUTANEOUS at 06:04

## 2022-08-04 NOTE — PLAN OF CARE
Problem: SKIN/TISSUE INTEGRITY - ADULT  Goal: Incision(s), wounds(s) or drain site(s) healing without S/S of infection  Description: INTERVENTIONS  - Assess and document dressing, incision, wound bed, drain sites and surrounding tissue  - Provide patient and family education  - Perform skin care/dressing changes daily  Problem: SAFETY ADULT  Goal: Maintain or return to baseline ADL function  Description: INTERVENTIONS:  -  Assess patient's ability to carry out ADLs; assess patient's baseline for ADL function and identify physical deficits which impact ability to perform ADLs (bathing, care of mouth/teeth, toileting, grooming, dressing, etc )  - Assess/evaluate cause of self-care deficits   - Assess range of motion  - Assess patient's mobility; develop plan if impaired  - Assess patient's need for assistive devices and provide as appropriate  - Encourage maximum independence but intervene and supervise when necessary  - Involve family in performance of ADLs  - Assess for home care needs following discharge   - Consider OT consult to assist with ADL evaluation and planning for discharge  - Provide patient education as appropriate  Outcome: Progressing     Problem: DISCHARGE PLANNING  Goal: Discharge to home or other facility with appropriate resources  Description: INTERVENTIONS:  - Identify barriers to discharge w/patient and caregiver  - Arrange for needed discharge resources and transportation as appropriate  - Identify discharge learning needs (meds, wound care, etc )  - Arrange for interpretive services to assist at discharge as needed  - Refer to Case Management Department for coordinating discharge planning if the patient needs post-hospital services based on physician/advanced practitioner order or complex needs related to functional status, cognitive ability, or social support system  Outcome: Progressing     Outcome: Progressing

## 2022-08-04 NOTE — DISCHARGE INSTRUCTIONS
Call the surgery office for appointment   Call the GI and GYN office for appointment to establish care  Take the antibiotic until it is finished  Resume your home medications  Keep a dry sterile dressing over the bit until it fully heals  It is ok to get it wet in the shower, no special soaps, antibiotic ointment, or creams are necessary   Pat it dry and then cover with dry sterile dressing

## 2022-08-04 NOTE — DISCHARGE SUMMARY
Discharge Summary - Rita Yanes 74 y o  female MRN: [id number]    Unit/Bed#: -01 Encounter: [id number]        Admitting Diagnosis: Cellulitis [L03 90]  Rash [R21]  Myositis [M60 9]    HPI: Admission Date: 8/1/2022 per Skylar Gaines PA-C, [address] is a 74y o  year old female  PHM:  Mucinous colon cancer with resection 2016, gluten intolerance, candida infection of the colon, Fe Deficiency Anemia,  Impaired fasting glucose, HTN,    who presents with area of erythema, blisters, superficial cutaneous tissue loss of the right lateral abdominal wall  Pt states that 4 days ago she felt an insect bite on this area  It became red and itchy very quickly, it continued to increase  The area blistered quickly and she felt like there was something under the skin  It became large and firm like a half of a melon  She was having pain with deep breathing of her lateral ribs  Her  insisted she report to the ED when it became more red over a larger area  She states she found spider webs in her garden and saw a black spider with white spots on its back in the area where she worked  A/P    Cellulitis right lateral chest wall, abdominal wall myositis  without abscess   Image in exam  Hypothyroidism   Candida of the colon present  prior to admission   Iron Deficiency Anemia   HTN  H/O gluten intolerance   H/O Impaired Fasting Glucose  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     -cont Zosyn  -cont pain control  -ok to advance diet, gluten free   -cont aqua K heat to area  -daily DSD changes  -cont Nystatin p o  home med  Pt to take her med from home  Procedures Performed: none     Hospital Course: the patient was admitted with the above plan  She received IV Zosyn with significant improvement of the cellulitis  Dry sterile dressing was applied and changed daily  The lesion remained dry without drainage  It progressed to a scab and superficial flaking of dry skin   The erythema had regressed to a small area of the madelaine wound  She remained afebrile and without leukocytosis  One blood culture returned with gram positive cocci after 48 hours  She will be discharged on Augmentin for 8 days  She will followup in the surgery office in 2 weeks  She will call the office if she develops fever, increased erythema, or purulent drainage from the lesion  Wound care instructions were given verbally and in writing  On the day of discharge   VSS  Lungs clear  RRR  Abd:  Soft, NT NBS   Right flank:  5 cm area of dry scab, erythema of approximately 2 cm surrounds the lesion  Superficial flaking skin in this area  Mild tenderness of the central area  No splinting with respirations  No calf tenderness  Complications: none     Discharge Diagnosis: Cellulitis [L03 90]  Rash [R21]  Myositis [M60 9]    Condition at Discharge: good     Discharge instructions/Information to patient and family:   See after visit summary for information provided to patient and family  Provisions for Follow-Up Care:  See after visit summary for information related to follow-up care and any pertinent home health orders  Disposition: See After Visit Summary for discharge disposition information  Planned Readmission: No    Discharge Statement   I spent 30  minutes discharging the patient  This time was spent on the day of discharge  I had direct contact with the patient on the day of discharge  Additional documentation is required if more than 30 minutes were spent on discharge  Discharge Medications:  See after visit summary for reconciled discharge medications provided to patient and family        Dean Garcia PA-C

## 2022-08-05 LAB
ALL TARGETS: NOT DETECTED
BACTERIA BLD CULT: ABNORMAL
GRAM STN SPEC: ABNORMAL

## 2022-08-05 NOTE — RESULT ENCOUNTER NOTE
1/2 cultures grew gram positive cocci in clusters  Patient admitted and culture results known at time of discharge  Discharged on Augmentin  SLIM following

## 2022-08-06 LAB — BACTERIA BLD CULT: NORMAL

## 2022-08-09 ENCOUNTER — OFFICE VISIT (OUTPATIENT)
Dept: INTERNAL MEDICINE CLINIC | Facility: CLINIC | Age: 67
End: 2022-08-09
Payer: MEDICARE

## 2022-08-09 ENCOUNTER — APPOINTMENT (OUTPATIENT)
Dept: LAB | Facility: CLINIC | Age: 67
End: 2022-08-09
Payer: MEDICARE

## 2022-08-09 VITALS
RESPIRATION RATE: 16 BRPM | WEIGHT: 150.2 LBS | DIASTOLIC BLOOD PRESSURE: 68 MMHG | HEIGHT: 66 IN | SYSTOLIC BLOOD PRESSURE: 112 MMHG | BODY MASS INDEX: 24.14 KG/M2 | HEART RATE: 60 BPM

## 2022-08-09 DIAGNOSIS — L03.313 CELLULITIS OF CHEST WALL: ICD-10-CM

## 2022-08-09 DIAGNOSIS — Z12.31 ENCOUNTER FOR SCREENING MAMMOGRAM FOR BREAST CANCER: Primary | ICD-10-CM

## 2022-08-09 DIAGNOSIS — R19.7 DIARRHEA, UNSPECIFIED TYPE: ICD-10-CM

## 2022-08-09 DIAGNOSIS — Z12.4 SCREENING FOR CERVICAL CANCER: ICD-10-CM

## 2022-08-09 PROCEDURE — 82784 ASSAY IGA/IGD/IGG/IGM EACH: CPT

## 2022-08-09 PROCEDURE — 36415 COLL VENOUS BLD VENIPUNCTURE: CPT

## 2022-08-09 PROCEDURE — 86364 TISS TRNSGLTMNASE EA IG CLAS: CPT

## 2022-08-09 PROCEDURE — 99496 TRANSJ CARE MGMT HIGH F2F 7D: CPT | Performed by: NURSE PRACTITIONER

## 2022-08-09 PROCEDURE — 86231 EMA EACH IG CLASS: CPT

## 2022-08-09 PROCEDURE — 86258 DGP ANTIBODY EACH IG CLASS: CPT

## 2022-08-09 NOTE — PROGRESS NOTES
INTERNAL MEDICINE TRANSITION OF CARE OFFICE VISIT  [facility]    NAME: Kavita Ozuna  AGE: 74 y o  SEX: female  : 1955     DATE: 2022     Assessment and Plan:     1  Encounter for screening mammogram for breast cancer      2  Cellulitis of chest wall  S/p cellulitis of the right rib region seconary to spide bite  Essentially resolved  Ok to apply moisturizer    3  pulm nodules  Following w/ oncology in [facility]    4  Hm  She cannot tolerate mammograms she is planning on thermography in [place]    5  Concern for celiac   Check panel        Transitional Care Management Review:     Kavita Ozuna is a 74 y o  female here for TCM follow-up    During the TCM phone call patient stated:    TCM Call     Date and time call was made  2022 10:18 AM    Hospital care reviewed  Records reviewed    Patient was hospitialized at  [facility]    Date of Admission  22    Date of discharge  22    Diagnosis  erythema, blisters, superficial cutaneous tissue loss right lateral abdominal wall=Cellulitis of chest wall    Disposition  Home    Were the patients medications reviewed and updated  Yes    Current Symptoms  None      TCM Call     Post hospital issues  None    Scheduled for follow up?   Yes    Patients specialists  Other (comment)    Other specialists names  Sagar Mahan III, [phone]  /  Argent[facility] Broges Surg-Jesus Heck, [phone] / Joanie Campoverde [phone]    Did you obtain your prescribed medications  Yes  Augmentin    Do you need help managing your prescriptions or medications  No    Is transportation to your appointment needed  No    I have advised the patient to call PCP with any new or worsening symptoms  [address] or [facility] other    Are you recieving any outpatient services  No    Are you recieving home care services  No    Have you fallen in the last 12 months  No HPI:     Pt is here to follow up recent hospitalization for spider bite  The wound is essentially resolved just red and itchy     The following portions of the patient's history were reviewed and updated as appropriate: allergies, current medications, past family history, past medical history, past social history, past surgical history and problem list      Review of Systems:     Review of Systems   Constitutional: Negative for appetite change, chills, diaphoresis, fatigue, fever and unexpected weight change  HENT: Negative for postnasal drip and sneezing  Eyes: Negative for visual disturbance  Respiratory: Negative for chest tightness and shortness of breath  Cardiovascular: Negative for chest pain, palpitations and leg swelling  Gastrointestinal: Negative for abdominal pain and blood in stool  Endocrine: Negative for cold intolerance, heat intolerance, polydipsia, polyphagia and polyuria  Genitourinary: Negative for difficulty urinating, dysuria, frequency and urgency  Musculoskeletal: Negative for arthralgias and myalgias  Skin: Negative for rash and wound  Right rib area less red + itchy   Neurological: Negative for dizziness, weakness, light-headedness and headaches  Hematological: Negative for adenopathy  Psychiatric/Behavioral: Negative for confusion, dysphoric mood and sleep disturbance  The patient is not nervous/anxious  Problem List:     Patient Active Problem List   Diagnosis    Anemia    Impaired fasting glucose    Mild vitamin D deficiency    Primary hypothyroidism    Mucinous adenocarcinoma of colon ([facility] [address] )    Pulmonary nodule    Primary hypertension    Cellulitis of chest wall        Objective:     /68 (BP Location: Left arm, Patient Position: Sitting, Cuff Size: Standard)   Pulse 60   Resp 16   Ht 5' 6" (1 676 m)   Wt 68 1 kg (150 lb 3 2 oz)   BMI 24 24 kg/m²     Physical Exam  Constitutional:       Appearance: She is well-developed  HENT:      Head: Normocephalic and atraumatic  Eyes:      Pupils: Pupils are equal, round, and reactive to light  Neck:      Thyroid: No thyromegaly  Cardiovascular:      Rate and Rhythm: Normal rate and regular rhythm  Heart sounds: No murmur heard  Pulmonary:      Effort: Pulmonary effort is normal       Breath sounds: Normal breath sounds  Abdominal:      General: Bowel sounds are normal       Palpations: Abdomen is soft  Musculoskeletal:         General: Normal range of motion  Cervical back: Normal range of motion and neck supple  Lymphadenopathy:      Cervical: No cervical adenopathy  Skin:     General: Skin is warm and dry  Findings: Erythema present  Neurological:      Mental Status: She is alert and oriented to person, place, and time  Laboratory Results: I have personally reviewed the pertinent laboratory results/reports     Radiology/Other Diagnostic Testing Results: I have personally reviewed pertinent reports  and I have personally reviewed pertinent films in PACS    CT chest abdomen pelvis w contrast    Result Date: 8/1/2022  CT CHEST, ABDOMEN AND PELVIS WITH IV CONTRAST INDICATION:   right-sided insect bite with cellulitis  Presumed insect bite to right chest wall  Localized area of blistering and redness, progressive redness and warmth  History of colon malignancy status post surgical treatment  COMPARISON:  8/16/2016 abdominopelvic CT TECHNIQUE: CT examination of the chest, abdomen and pelvis was performed  Axial, sagittal, and coronal 2D reformatted images were created from the source data and submitted for interpretation  Radiation dose length product (DLP) for this visit:  617 mGy-cm   This examination, like all CT scans performed in the [facility], was performed utilizing techniques to minimize radiation dose exposure, including the use of iterative reconstruction and automated exposure control   IV Contrast:  65 mL of iohexol (OMNIPAQUE) Enteric Contrast: Enteric contrast was not administered  FINDINGS: CHEST LUNGS:  Several, tiny, posterior lower lobe pulmonary nodules, largest 0 4 cm (3/67)  Many may have been obscured on the prior remote study by dependent atelectasis  Single calcified, benign granuloma  Patent airways  PLEURA:  Within normal limits  HEART/GREAT VESSELS:  Mild coronary artery calcification  Normal aortic caliber and enhancement  Developmental bovine-type aortic arch branching  Patent branch vessels and visualized vertebral arteries  Normal heart size  MEDIASTINUM AND LUCA:  Within normal limits  CHEST WALL AND LOWER NECK:   Solitary enlarged right axillary lymph node measures 1 6 cm short axis, preserved fatty hilum, no evidence of necrosis or calcification  Other axillary lymph nodes are mildly more prominent on the right, but not grossly enlarged  Mild inferior right chest wall soft tissue edema  Abdominal wall edema to be described below  No enlarged supraclavicular lymph nodes  Solitary left thyroid calcification without mass  No breast mass or suspicious calcification  ABDOMEN LIVER/BILIARY TREE:  Few tiny calcifications, typically granulomatous  Otherwise within normal limits  GALLBLADDER:  Within normal limits  SPLEEN:  Within normal limits  PANCREAS:  Within normal limits  ADRENAL GLANDS:  Within KIDNEYS/URETERS:  Cysts and small cortical hypodensities too small to definitively characterize, statistically likely cysts  STOMACH AND BOWEL:  Surgical change  No acute or suspicious finding  APPENDIX:  No evidence of acute appendicitis  ABDOMINOPELVIC CAVITY:  Small free pelvic fluid is nonspecific, likely likely physiologic  No fluid collections, abnormal air or enlarged lymph nodes  VESSELS:  Mild atherosclerosis  No aneurysm or acute finding  PELVIS: REPRODUCTIVE ORGANS:  Uterus within normal limits  No adnexal mass  URINARY BLADDER:  Within normal limits   ABDOMINAL WALL/INGUINAL REGIONS:  Right lateral abdominal wall edema and skin thickening, greatest at the upper abdomen  Subjacent abdominal wall muscles are mildly edematous, without focal findings  No ulcer, sinus tract, fluid collection or radiopaque foreign body  OSSEOUS STRUCTURES:  No destructive or erosive change adjacent to the soft tissue findings above  No acute or suspicious findings  Significant findings sent by [address] [place] [address] [facility]  Findings most compatible with right lateral chest wall cellulitis and subjacent myositis  No abscess  Solitary enlarged right axillary lymph node is likely reactive  Numerous tiny pulmonary nodules, likely benign, given history of malignancy follow-up is recommended  Otherwise no acute findings in the chest, abdomen or pelvis  Workstation performed: [id number]        Current Medications:     Outpatient Medications Prior to Visit   Medication Sig Dispense Refill    amoxicillin-clavulanate (AUGMENTIN) 875-125 mg per tablet Take 1 tablet by mouth every 12 (twelve) hours for 8 days 16 tablet 0    Cholecalciferol (VITAMIN D3) 5000 units CAPS Take by mouth      ergocalciferol (ERGOCALCIFEROL) 83699 units capsule Take 50,000 Units by mouth      nystatin (MYCOSTATIN) 500,000 units tablet Take 1 tablet (500,000 Units total) by mouth in the morning 90 tablet 0     No facility-administered medications prior to visit         BENEDICT Welsh  [facility]

## 2022-08-11 LAB
ENDOMYSIUM IGA SER QL: NEGATIVE
GLIADIN PEPTIDE IGA SER-ACNC: 4 UNITS (ref 0–19)
GLIADIN PEPTIDE IGG SER-ACNC: 3 UNITS (ref 0–19)
IGA SERPL-MCNC: 207 MG/DL (ref 87–352)
TTG IGA SER-ACNC: <2 U/ML (ref 0–3)
TTG IGG SER-ACNC: 6 U/ML (ref 0–5)

## 2022-08-15 ENCOUNTER — TELEPHONE (OUTPATIENT)
Dept: INTERNAL MEDICINE CLINIC | Facility: CLINIC | Age: 67
End: 2022-08-15

## 2022-08-15 DIAGNOSIS — R19.7 DIARRHEA, UNSPECIFIED TYPE: Primary | ICD-10-CM

## 2022-08-15 NOTE — TELEPHONE ENCOUNTER
Called patient and notified her of her lab result as per [facility]

## 2022-08-15 NOTE — TELEPHONE ENCOUNTER
----- Message from Rachael Ariza, [address] sent at 8/15/2022  5:00 PM EDT -----  Her one blood test was weakly positive for celiac- I dont think the number is high enough to actual represent disease  I think we should redo this labs in 4-6 months to see if it changes   She can also discuss with GI - they could do a biopsy to know   for sure

## 2023-04-26 DIAGNOSIS — Z12.31 ENCOUNTER FOR SCREENING MAMMOGRAM FOR BREAST CANCER: Primary | ICD-10-CM

## 2023-04-27 ENCOUNTER — APPOINTMENT (OUTPATIENT)
Age: 68
End: 2023-04-27

## 2023-04-27 ENCOUNTER — OFFICE VISIT (OUTPATIENT)
Age: 68
End: 2023-04-27

## 2023-04-27 VITALS
WEIGHT: 143 LBS | HEART RATE: 75 BPM | SYSTOLIC BLOOD PRESSURE: 126 MMHG | DIASTOLIC BLOOD PRESSURE: 66 MMHG | BODY MASS INDEX: 22.98 KG/M2 | RESPIRATION RATE: 18 BRPM | TEMPERATURE: 97.9 F | OXYGEN SATURATION: 99 % | HEIGHT: 66 IN

## 2023-04-27 DIAGNOSIS — K63.9 COLON ABNORMALITY: ICD-10-CM

## 2023-04-27 DIAGNOSIS — T14.8XXA BRUISING: Primary | ICD-10-CM

## 2023-04-27 DIAGNOSIS — D50.9 IRON DEFICIENCY ANEMIA, UNSPECIFIED IRON DEFICIENCY ANEMIA TYPE: ICD-10-CM

## 2023-04-27 DIAGNOSIS — T14.8XXA BRUISING: ICD-10-CM

## 2023-04-27 LAB
APTT PPP: 28 SECONDS (ref 23–37)
BASOPHILS # BLD AUTO: 0.06 THOUSANDS/ΜL (ref 0–0.1)
BASOPHILS NFR BLD AUTO: 1 % (ref 0–1)
EOSINOPHIL # BLD AUTO: 0.1 THOUSAND/ΜL (ref 0–0.61)
EOSINOPHIL NFR BLD AUTO: 2 % (ref 0–6)
ERYTHROCYTE [DISTWIDTH] IN BLOOD BY AUTOMATED COUNT: 12.7 % (ref 11.6–15.1)
FERRITIN SERPL-MCNC: 58 NG/ML (ref 8–388)
FIBRINOGEN PPP-MCNC: 269 MG/DL (ref 227–495)
HCT VFR BLD AUTO: 42.7 % (ref 34.8–46.1)
HGB BLD-MCNC: 14.1 G/DL (ref 11.5–15.4)
IMM GRANULOCYTES # BLD AUTO: 0.01 THOUSAND/UL (ref 0–0.2)
IMM GRANULOCYTES NFR BLD AUTO: 0 % (ref 0–2)
INR PPP: 0.92 (ref 0.84–1.19)
IRON SATN MFR SERPL: 20 % (ref 15–50)
IRON SERPL-MCNC: 83 UG/DL (ref 50–170)
LYMPHOCYTES # BLD AUTO: 1.48 THOUSANDS/ΜL (ref 0.6–4.47)
LYMPHOCYTES NFR BLD AUTO: 27 % (ref 14–44)
MCH RBC QN AUTO: 31.6 PG (ref 26.8–34.3)
MCHC RBC AUTO-ENTMCNC: 33 G/DL (ref 31.4–37.4)
MCV RBC AUTO: 96 FL (ref 82–98)
MONOCYTES # BLD AUTO: 0.48 THOUSAND/ΜL (ref 0.17–1.22)
MONOCYTES NFR BLD AUTO: 9 % (ref 4–12)
NEUTROPHILS # BLD AUTO: 3.46 THOUSANDS/ΜL (ref 1.85–7.62)
NEUTS SEG NFR BLD AUTO: 61 % (ref 43–75)
NRBC BLD AUTO-RTO: 0 /100 WBCS
PLATELET # BLD AUTO: 271 THOUSANDS/UL (ref 149–390)
PMV BLD AUTO: 12.3 FL (ref 8.9–12.7)
PROTHROMBIN TIME: 12.5 SECONDS (ref 11.6–14.5)
RBC # BLD AUTO: 4.46 MILLION/UL (ref 3.81–5.12)
THROMBIN TIME: 17.1 SECONDS (ref 14.7–18.4)
TIBC SERPL-MCNC: 405 UG/DL (ref 250–450)
WBC # BLD AUTO: 5.59 THOUSAND/UL (ref 4.31–10.16)

## 2023-04-27 NOTE — PROGRESS NOTES
INTERNAL MEDICINE FOLLOW-UP VISIT  [facility] [place]    NAME: Cheng Rolon  AGE: 79 y o  SEX: female  : 1955     DATE: 2023     Assessment and Plan:   1  Bruising  Diffuse areas of ecchymosis to arms and legs,  Will check coagulation studies  No use of asa or NSAIDs, fish oil, or Vit E  She does take Kelp  - Protime-INR; Future  - APTT; Future  - Thrombin time; Future  - CBC and differential; Future  - Fibrinogen; Future  - von Willebrand Profile; Future        No follow-ups on file  Chief Complaint:     Chief Complaint   Patient presents with   • Follow-up     Bruising would like labs ordered  History of Present Illness:     Patient today complains of bruising easily  She denies any injury but states bruises will pop up diffusely  She denies any myalgias, or joint pain, no SOB, no chest pain  She only admits to taking Vit D and calcium plus Kelp  She denies any use of NSAIDs or ASA use  The following portions of the patient's history were reviewed and updated as appropriate: allergies, current medications, past family history, past medical history, past social history, past surgical history and problem list      Review of Systems:     Review of Systems   Constitutional: Negative for appetite change, chills, diaphoresis, fatigue, fever and unexpected weight change  HENT: Negative for postnasal drip and sneezing  Eyes: Negative for visual disturbance  Respiratory: Negative for chest tightness and shortness of breath  Cardiovascular: Negative for chest pain, palpitations and leg swelling  Gastrointestinal: Negative for abdominal pain and blood in stool  Endocrine: Negative for cold intolerance, heat intolerance, polydipsia, polyphagia and polyuria  Genitourinary: Negative for difficulty urinating, dysuria, frequency and urgency  Musculoskeletal: Negative for arthralgias and myalgias  Skin: Negative for rash and wound  "  Neurological: Negative for dizziness, weakness, light-headedness and headaches  Hematological: Negative for adenopathy  Bruises/bleeds easily  Psychiatric/Behavioral: Negative for confusion, dysphoric mood and sleep disturbance  The patient is not nervous/anxious  Past Medical History:     Past Medical History:   Diagnosis Date   • Endometriosis    • Pleurisy     LAST ASSESSED: [id number]        Current Medications:     Current Outpatient Medications:   •  Cholecalciferol (VITAMIN D3) 5000 units CAPS, Take by mouth, Disp: , Rfl:   •  ergocalciferol (ERGOCALCIFEROL) 68602 units capsule, Take 50,000 Units by mouth, Disp: , Rfl:      Allergies: Allergies   Allergen Reactions   • Medical Tape Swelling     Denies this allergies  bandaids---redness, itchy site        Physical Exam:     /66 (BP Location: Left arm, Patient Position: Sitting, Cuff Size: Standard)   Pulse 75   Temp 97 9 °F (36 6 °C) (Tympanic)   Resp 18   Ht 5' 6\" (1 676 m)   Wt 64 9 kg (143 lb)   SpO2 99%   BMI 23 08 kg/m²     Physical Exam  Constitutional:       Appearance: She is well-developed  HENT:      Head: Normocephalic and atraumatic  Eyes:      Pupils: Pupils are equal, round, and reactive to light  Neck:      Thyroid: No thyromegaly  Cardiovascular:      Rate and Rhythm: Normal rate and regular rhythm  Heart sounds: No murmur heard  Pulmonary:      Effort: Pulmonary effort is normal       Breath sounds: Normal breath sounds  Abdominal:      General: Bowel sounds are normal       Palpations: Abdomen is soft  Musculoskeletal:         General: Normal range of motion  Cervical back: Normal range of motion and neck supple  Lymphadenopathy:      Cervical: No cervical adenopathy  Skin:     General: Skin is warm and dry  Findings: Bruising present  Neurological:      Mental Status: She is alert and oriented to person, place, and time             Data:     Laboratory Results: I have personally " reviewed the pertinent laboratory results/reports   Radiology/Other Diagnostic Testing Results: I have personally reviewed pertinent reports        BENEDICT Peguero  [facility]

## 2023-04-28 ENCOUNTER — APPOINTMENT (OUTPATIENT)
Age: 68
End: 2023-04-28

## 2023-04-28 DIAGNOSIS — K63.9 COLON ABNORMALITY: ICD-10-CM

## 2023-04-28 LAB — HEMOCCULT STL QL IA: NEGATIVE

## 2023-05-01 ENCOUNTER — TELEPHONE (OUTPATIENT)
Age: 68
End: 2023-05-01

## 2023-05-02 LAB — FACT VIII AG ACT/NOR PPP IA: 115 %

## 2023-05-03 LAB
FACT XIIIA PPP-ACNC: 88 % (ref 56–140)
FACT XIIIA PPP-ACNC: 96 % (ref 56–140)
VWF AG ACT/NOR PPP IA: 147 % (ref 50–200)
VWF:RCO ACT/NOR PPP PL AGG: 113 % (ref 50–200)

## 2023-05-10 ENCOUNTER — TELEPHONE (OUTPATIENT)
Age: 68
End: 2023-05-10

## 2023-05-10 LAB — VWF MULTIMERS PPP IB: NORMAL

## 2023-05-11 ENCOUNTER — TELEPHONE (OUTPATIENT)
Age: 68
End: 2023-05-11

## 2023-05-11 NOTE — TELEPHONE ENCOUNTER
----- Message from [id number] sent at 5/11/2023 10:02 AM EDT -----  Your clotting factors all came back normal

## 2024-08-02 ENCOUNTER — TELEPHONE (OUTPATIENT)
Age: 69
End: 2024-08-02

## 2024-08-15 ENCOUNTER — RA CDI HCC (OUTPATIENT)
Dept: OTHER | Facility: HOSPITAL | Age: 69
End: 2024-08-15

## 2024-08-22 ENCOUNTER — OFFICE VISIT (OUTPATIENT)
Age: 69
End: 2024-08-22
Payer: MEDICARE

## 2024-08-22 VITALS
WEIGHT: 144 LBS | BODY MASS INDEX: 23.14 KG/M2 | SYSTOLIC BLOOD PRESSURE: 124 MMHG | HEART RATE: 80 BPM | OXYGEN SATURATION: 98 % | RESPIRATION RATE: 18 BRPM | HEIGHT: 66 IN | DIASTOLIC BLOOD PRESSURE: 82 MMHG

## 2024-08-22 DIAGNOSIS — E55.9 MILD VITAMIN D DEFICIENCY: ICD-10-CM

## 2024-08-22 DIAGNOSIS — Z78.0 POSTMENOPAUSAL STATUS, AGE-RELATED: ICD-10-CM

## 2024-08-22 DIAGNOSIS — Z12.31 ENCOUNTER FOR SCREENING MAMMOGRAM FOR BREAST CANCER: ICD-10-CM

## 2024-08-22 DIAGNOSIS — Z13.9 SCREENING DUE: Primary | ICD-10-CM

## 2024-08-22 DIAGNOSIS — Z85.038 HISTORY OF MALIGNANT NEOPLASM OF COLON: ICD-10-CM

## 2024-08-22 DIAGNOSIS — E55.9 VITAMIN D DEFICIENCY: ICD-10-CM

## 2024-08-22 PROCEDURE — G0438 PPPS, INITIAL VISIT: HCPCS | Performed by: INTERNAL MEDICINE

## 2024-08-22 PROCEDURE — 99214 OFFICE O/P EST MOD 30 MIN: CPT | Performed by: INTERNAL MEDICINE

## 2024-08-22 NOTE — PATIENT INSTRUCTIONS
Please get you labs done within the next month.  Please get your mammogram done  Please get your dexa scan done  Please follow in 4 weeks for review of your labs

## 2024-08-22 NOTE — PROGRESS NOTES
Ambulatory Visit  Name: Steffanie Hahn      : 1955      MRN: [id number]  Encounter Provider: Yonathan Laguerre MD  Encounter Date: 2024   Encounter department: [facility] INTERNAL MEDICINE [facility]    Assessment & Plan   1. Screening due  -     Hemoglobin A1C; Future; Expected date: 2024  -     TSH, 3rd generation with Free T4 reflex; Future; Expected date: 2024  -     Lipid panel; Future; Expected date: 2024  -     CBC and differential; Future; Expected date: 2024  -     Comprehensive metabolic panel; Future; Expected date: 2024  -     TIBC Panel (incl. Iron, TIBC, % Iron Saturation); Future; Expected date: 2024  -     CEA; Future; Expected date: 2024  -     Occult blood 1-3, stool; Future; Expected date: 2024  -     Vitamin D 25 hydroxy; Future  2. Encounter for screening mammogram for breast cancer  -     Mammo screening bilateral w 3d & cad; Future; Expected date: 2024  -     DXA bone density spine hip and pelvis; Future; Expected date: 2024  -     US breast screening bilateral complete (ABUS); Future; Expected date: 2024  3. Vitamin D deficiency  -     Vitamin D 25 hydroxy; Future  4. Postmenopausal status, age-related  -     DXA bone density spine hip and pelvis; Future; Expected date: 2024  5. History of malignant neoplasm of colon  6. Mild vitamin D deficiency  Assessment & Plan:  Will continue taking vitamin D supplements  Follow up Vitamin D levels        Preventive health issues were discussed with patient, and age appropriate screening tests were ordered as noted in patient's After Visit Summary. Personalized health advice and appropriate referrals for health education or preventive services given if needed, as noted in patient's After Visit Summary.    History of Present Illness     Patient is a 69-year-old female with a past medical history of adenocarcinoma of the ascending colon, status post right  hemicolectomy in 2016, iron deficiency anemia, vitamin D deficiency came for a annual wellness visit.  Patient has no active complaints at this time.  Discussed age-appropriate screening with the patient.  Patient eats a majority plant based diet with chicken/ turkey for protein.  Patient says that she sleeps about 9 hours per night and wakes up feeling refreshed.  She says she has about 3-5 soft bowel movements per day.  She denies any melena or blood in stool.  She denies any fevers, chills, weight loss, chest pain, abdominal pain, nausea, vomiting, diarrhea, constipation.  On review of system patient does endorse an episode of palpitations about 1 week ago that lasted less than 1 second and was associated with shortness of breath.  Patient said it resolved within 1 second and was alleviated with deep breathing exercise.  Patient denies any chest pain at that time, she denies any dizziness, presyncope or headaches.  Of note patient also fell on her outstretched hands about 2 weeks ago, she denies any limitations in her range of motion, imaging was done and found no acute findings.  Patient requesting a gynecologic referral.       Patient Care Team:  Bari Butler DO as PCP - General (Family Medicine)  Joseph Gar MD    Review of Systems   Constitutional:  Negative for chills and fever.   HENT:  Negative for ear pain and sore throat.    Respiratory:  Negative for cough, chest tightness and shortness of breath.    Cardiovascular:  Negative for chest pain and palpitations.   Gastrointestinal:  Negative for abdominal pain, blood in stool, constipation, diarrhea, nausea and vomiting.   Genitourinary:  Negative for hematuria.   Musculoskeletal:  Positive for arthralgias.   Neurological:  Negative for dizziness, syncope and light-headedness.   All other systems reviewed and are negative.    Medical History Reviewed by provider this encounter:  Tobacco  Allergies  Meds  Problems  Med Hx  Surg Hx  Fam Hx        Annual Wellness Visit Questionnaire       Health Risk Assessment:   Patient rates overall health as excellent. Patient feels that their physical health rating is same. Patient is very satisfied with their life. Eyesight was rated as same. Hearing was rated as same. Patient feels that their emotional and mental health rating is same. Patients states they are never, rarely angry. Patient states they are never, rarely unusually tired/fatigued. Pain experienced in the last 7 days has been none. Patient states that she has experienced no weight loss or gain in last 6 months.     Depression Screening:   PHQ-2 Score: 0      Fall Risk Screening:   In the past year, patient has experienced: history of falling in past year    Number of falls: 1  Injured during fall?: Yes    Feels unsteady when standing or walking?: No    Worried about falling?: No      Urinary Incontinence Screening:   Patient has not leaked urine accidently in the last six months.     Home Safety:  Patient does not have trouble with stairs inside or outside of their home. Patient has working smoke alarms and has working carbon monoxide detector. Home safety hazards include: none.     Nutrition:   Current diet is Regular.     Medications:   Patient is currently taking over-the-counter supplements. OTC medications include: see medication list. Patient is able to manage medications.     Activities of Daily Living (ADLs)/Instrumental Activities of Daily Living (IADLs):   Walk and transfer into and out of bed and chair?: Yes  Dress and groom yourself?: Yes    Bathe or shower yourself?: Yes    Feed yourself? Yes  Do your laundry/housekeeping?: Yes  Manage your money, pay your bills and track your expenses?: Yes  Make your own meals?: Yes    Do your own shopping?: Yes    Previous Hospitalizations:   Any hospitalizations or ED visits within the last 12 months?: No      Advance Care Planning:   Living will: Yes    Advanced directive: Yes      PREVENTIVE  "SCREENINGS      Cardiovascular Screening:    General: Screening Current      Colorectal Cancer Screening:     General: History Colorectal Cancer      Cervical Cancer Screening:    General: Screening Not Indicated      Lung Cancer Screening:     General: Screening Not Indicated      Hepatitis C Screening:    General: Screening Current    Screening, Brief Intervention, and Referral to Treatment (SBIRT)    Screening  Typical number of drinks in a day: 0  Typical number of drinks in a week: 0  Interpretation: Low risk drinking behavior.    Single Item Drug Screening:  How often have you used an illegal drug (including marijuana) or a prescription medication for non-medical reasons in the past year? never    Single Item Drug Screen Score: 0  Interpretation: Negative screen for possible drug use disorder    Social Determinants of Health     Food Insecurity: No Food Insecurity (8/22/2024)    Hunger Vital Sign     Worried About Running Out of Food in the Last Year: Never true     Ran Out of Food in the Last Year: Never true   Transportation Needs: No Transportation Needs (8/22/2024)    PRAPARE - Transportation     Lack of Transportation (Medical): No     Lack of Transportation (Non-Medical): No   Housing Stability: Unknown (8/22/2024)    Housing Stability Vital Sign     Unable to Pay for Housing in the Last Year: No     Homeless in the Last Year: No   Utilities: Not At Risk (8/22/2024)    [facility] Utilities     Threatened with loss of utilities: No     No results found.    Objective     /82 (BP Location: Left arm, Patient Position: Sitting, Cuff Size: Standard)   Pulse 80   Resp 18   Ht 5' 6\" (1.676 m)   Wt 65.3 kg (144 lb)   SpO2 98%   BMI 23.24 kg/m²     Physical Exam  Vitals and nursing note reviewed.   Constitutional:       General: She is not in acute distress.     Appearance: She is well-developed.   HENT:      Head: Normocephalic and atraumatic.   Eyes:      Conjunctiva/sclera: Conjunctivae normal. "   Cardiovascular:      Rate and Rhythm: Normal rate and regular rhythm.      Heart sounds: No murmur heard.  Pulmonary:      Effort: Pulmonary effort is normal. No respiratory distress.      Breath sounds: Normal breath sounds.   Abdominal:      Palpations: Abdomen is soft.      Tenderness: There is no abdominal tenderness.   Musculoskeletal:         General: No swelling.      Cervical back: Neck supple.   Skin:     General: Skin is warm and dry.   Neurological:      Mental Status: She is alert.

## 2024-08-29 ENCOUNTER — APPOINTMENT (OUTPATIENT)
Dept: LAB | Facility: HOSPITAL | Age: 69
End: 2024-08-29
Payer: MEDICARE

## 2024-08-29 DIAGNOSIS — E55.9 VITAMIN D DEFICIENCY: ICD-10-CM

## 2024-08-29 DIAGNOSIS — Z13.9 SCREENING DUE: ICD-10-CM

## 2024-08-29 LAB
25(OH)D3 SERPL-MCNC: 43.9 NG/ML (ref 30–100)
ALBUMIN SERPL BCG-MCNC: 4.3 G/DL (ref 3.5–5)
ALP SERPL-CCNC: 64 U/L (ref 34–104)
ALT SERPL W P-5'-P-CCNC: 16 U/L (ref 7–52)
ANION GAP SERPL CALCULATED.3IONS-SCNC: 6 MMOL/L (ref 4–13)
AST SERPL W P-5'-P-CCNC: 20 U/L (ref 13–39)
BASOPHILS # BLD AUTO: 0.06 THOUSANDS/ÂΜL (ref 0–0.1)
BASOPHILS NFR BLD AUTO: 1 % (ref 0–1)
BILIRUB SERPL-MCNC: 0.97 MG/DL (ref 0.2–1)
BUN SERPL-MCNC: 19 MG/DL (ref 5–25)
CALCIUM SERPL-MCNC: 9.5 MG/DL (ref 8.4–10.2)
CEA SERPL-MCNC: 1 NG/ML (ref 0–3)
CHLORIDE SERPL-SCNC: 106 MMOL/L (ref 96–108)
CHOLEST SERPL-MCNC: 144 MG/DL
CO2 SERPL-SCNC: 30 MMOL/L (ref 21–32)
CREAT SERPL-MCNC: 0.62 MG/DL (ref 0.6–1.3)
EOSINOPHIL # BLD AUTO: 0.15 THOUSAND/ÂΜL (ref 0–0.61)
EOSINOPHIL NFR BLD AUTO: 3 % (ref 0–6)
ERYTHROCYTE [DISTWIDTH] IN BLOOD BY AUTOMATED COUNT: 12.9 % (ref 11.6–15.1)
EST. AVERAGE GLUCOSE BLD GHB EST-MCNC: 120 MG/DL
GFR SERPL CREATININE-BSD FRML MDRD: 93 ML/MIN/1.73SQ M
GLUCOSE P FAST SERPL-MCNC: 94 MG/DL (ref 65–99)
HBA1C MFR BLD: 5.8 %
HCT VFR BLD AUTO: 43.1 % (ref 34.8–46.1)
HDLC SERPL-MCNC: 69 MG/DL
HGB BLD-MCNC: 13.8 G/DL (ref 11.5–15.4)
IMM GRANULOCYTES # BLD AUTO: 0.01 THOUSAND/UL (ref 0–0.2)
IMM GRANULOCYTES NFR BLD AUTO: 0 % (ref 0–2)
IRON SATN MFR SERPL: 22 % (ref 15–50)
IRON SERPL-MCNC: 81 UG/DL (ref 50–212)
LDLC SERPL CALC-MCNC: 62 MG/DL (ref 0–100)
LYMPHOCYTES # BLD AUTO: 1.43 THOUSANDS/ÂΜL (ref 0.6–4.47)
LYMPHOCYTES NFR BLD AUTO: 27 % (ref 14–44)
MCH RBC QN AUTO: 30 PG (ref 26.8–34.3)
MCHC RBC AUTO-ENTMCNC: 32 G/DL (ref 31.4–37.4)
MCV RBC AUTO: 94 FL (ref 82–98)
MONOCYTES # BLD AUTO: 0.45 THOUSAND/ÂΜL (ref 0.17–1.22)
MONOCYTES NFR BLD AUTO: 8 % (ref 4–12)
NEUTROPHILS # BLD AUTO: 3.27 THOUSANDS/ÂΜL (ref 1.85–7.62)
NEUTS SEG NFR BLD AUTO: 61 % (ref 43–75)
NONHDLC SERPL-MCNC: 75 MG/DL
NRBC BLD AUTO-RTO: 0 /100 WBCS
PLATELET # BLD AUTO: 243 THOUSANDS/UL (ref 149–390)
PMV BLD AUTO: 12.1 FL (ref 8.9–12.7)
POTASSIUM SERPL-SCNC: 3.6 MMOL/L (ref 3.5–5.3)
PROT SERPL-MCNC: 6.6 G/DL (ref 6.4–8.4)
RBC # BLD AUTO: 4.6 MILLION/UL (ref 3.81–5.12)
SODIUM SERPL-SCNC: 142 MMOL/L (ref 135–147)
T4 FREE SERPL-MCNC: 0.97 NG/DL (ref 0.61–1.12)
TIBC SERPL-MCNC: 366 UG/DL (ref 250–450)
TRIGL SERPL-MCNC: 65 MG/DL
TSH SERPL DL<=0.05 MIU/L-ACNC: 4.8 UIU/ML (ref 0.45–4.5)
UIBC SERPL-MCNC: 285 UG/DL (ref 155–355)
WBC # BLD AUTO: 5.37 THOUSAND/UL (ref 4.31–10.16)

## 2024-08-29 PROCEDURE — 82378 CARCINOEMBRYONIC ANTIGEN: CPT

## 2024-08-29 PROCEDURE — 80061 LIPID PANEL: CPT

## 2024-08-29 PROCEDURE — 85025 COMPLETE CBC W/AUTO DIFF WBC: CPT

## 2024-08-29 PROCEDURE — 83540 ASSAY OF IRON: CPT

## 2024-08-29 PROCEDURE — 84443 ASSAY THYROID STIM HORMONE: CPT

## 2024-08-29 PROCEDURE — 84439 ASSAY OF FREE THYROXINE: CPT

## 2024-08-29 PROCEDURE — 80053 COMPREHEN METABOLIC PANEL: CPT

## 2024-08-29 PROCEDURE — 36415 COLL VENOUS BLD VENIPUNCTURE: CPT

## 2024-08-29 PROCEDURE — 83036 HEMOGLOBIN GLYCOSYLATED A1C: CPT

## 2024-08-29 PROCEDURE — 83550 IRON BINDING TEST: CPT

## 2024-08-29 PROCEDURE — 82306 VITAMIN D 25 HYDROXY: CPT

## 2024-08-30 ENCOUNTER — TELEPHONE (OUTPATIENT)
Age: 69
End: 2024-08-30

## 2024-08-30 NOTE — TELEPHONE ENCOUNTER
Patient called and is asking if Dr. Laguerre can recommend a gynecologist in the area.  He did place the referral for her.  She said she went to MrRonny Barnes in the past but she does not want to see her again. She had a very painful experience with her and would like to see someone else.   She would like a call back on her home phone and a message can be left if she does not answer.

## 2024-10-01 ENCOUNTER — APPOINTMENT (OUTPATIENT)
Dept: LAB | Facility: HOSPITAL | Age: 69
End: 2024-10-01
Payer: MEDICARE

## 2024-10-01 DIAGNOSIS — Z13.9 SCREENING FOR UNSPECIFIED CONDITION: Primary | ICD-10-CM

## 2024-10-01 LAB — HEMOCCULT STL QL IA: NEGATIVE

## 2024-10-01 PROCEDURE — G0328 FECAL BLOOD SCRN IMMUNOASSAY: HCPCS

## 2024-10-02 ENCOUNTER — APPOINTMENT (OUTPATIENT)
Age: 69
End: 2024-10-02
Payer: MEDICARE

## 2024-10-02 ENCOUNTER — HOSPITAL ENCOUNTER (OUTPATIENT)
Age: 69
Discharge: HOME | End: 2024-10-02
Payer: MEDICARE

## 2024-10-02 VITALS — BODY MASS INDEX: 24.91 KG/M2 | HEIGHT: 64 IN

## 2024-10-02 DIAGNOSIS — Z78.0 POSTMENOPAUSAL STATUS, AGE-RELATED: ICD-10-CM

## 2024-10-02 DIAGNOSIS — Z12.31 ENCOUNTER FOR SCREENING MAMMOGRAM FOR BREAST CANCER: ICD-10-CM

## 2024-10-02 PROCEDURE — 77080 DXA BONE DENSITY AXIAL: CPT

## 2024-10-03 ENCOUNTER — RA CDI HCC (OUTPATIENT)
Dept: OTHER | Facility: HOSPITAL | Age: 69
End: 2024-10-03

## 2024-10-10 ENCOUNTER — OFFICE VISIT (OUTPATIENT)
Age: 69
End: 2024-10-10
Payer: MEDICARE

## 2024-10-10 VITALS
RESPIRATION RATE: 16 BRPM | HEIGHT: 64 IN | DIASTOLIC BLOOD PRESSURE: 80 MMHG | BODY MASS INDEX: 24.59 KG/M2 | SYSTOLIC BLOOD PRESSURE: 124 MMHG | WEIGHT: 144 LBS | HEART RATE: 60 BPM | OXYGEN SATURATION: 99 %

## 2024-10-10 DIAGNOSIS — J34.2 NASAL SEPTAL DEVIATION: ICD-10-CM

## 2024-10-10 DIAGNOSIS — C18.9 MUCINOUS ADENOCARCINOMA OF COLON (HCC): ICD-10-CM

## 2024-10-10 DIAGNOSIS — M35.00 SJOGREN'S SYNDROME, WITH UNSPECIFIED ORGAN INVOLVEMENT (HCC): ICD-10-CM

## 2024-10-10 DIAGNOSIS — R53.83 OTHER FATIGUE: ICD-10-CM

## 2024-10-10 DIAGNOSIS — Z13.220 SCREENING FOR LIPID DISORDERS: ICD-10-CM

## 2024-10-10 DIAGNOSIS — E03.8 SUBCLINICAL HYPOTHYROIDISM: ICD-10-CM

## 2024-10-10 DIAGNOSIS — R73.03 PREDIABETES: Primary | ICD-10-CM

## 2024-10-10 DIAGNOSIS — E21.3 HYPERPARATHYROIDISM (HCC): ICD-10-CM

## 2024-10-10 PROCEDURE — 99214 OFFICE O/P EST MOD 30 MIN: CPT

## 2024-10-10 PROCEDURE — G2211 COMPLEX E/M VISIT ADD ON: HCPCS

## 2024-10-10 NOTE — PROGRESS NOTES
INTERNAL MEDICINE FOLLOW-UP VISIT  [facility] - [facility] INTERNAL MEDICINE [address]    NAME: Steffanie Hahn  AGE: 69 y.o. SEX: female  : 1955     DATE: 10/10/2024     Assessment and Plan:   1. Prediabetes  A1c is slightly elevated at 5.8.  She does admit to eating a lot of fresh berries, recommended eating in moderation.    2. Subclinical hypothyroidism  TSH is slightly elevated, but T4 is normal.  Will continue to monitor.    3.  Nasal septal deviation  She admits to a fall recently landing on her face around 1 and half months ago.  She has been unable to breathe out of her left nostril due to some deviation.  Recommended evaluation by ENT, referral placed.        No follow-ups on file.       Chief Complaint:     Chief Complaint   Patient presents with    Follow-up     4 weeks      History of Present Illness:   Patient is a 69-year-old female that presents today for a 4-week follow-up.    The following portions of the patient's history were reviewed and updated as appropriate: allergies, current medications, past family history, past medical history, past social history, past surgical history and problem list.     Review of Systems:     Review of Systems   Constitutional:  Negative for chills, fatigue and fever.   HENT:  Negative for ear discharge, ear pain, postnasal drip, rhinorrhea, sinus pressure, sinus pain, sore throat, tinnitus and trouble swallowing.    Eyes:  Negative for pain, discharge and itching.   Respiratory:  Negative for cough, shortness of breath and wheezing.    Cardiovascular:  Negative for chest pain, palpitations and leg swelling.   Gastrointestinal:  Negative for abdominal pain, constipation, diarrhea, nausea and vomiting.   Endocrine: Negative for polydipsia, polyphagia and polyuria.   Genitourinary:  Negative for difficulty urinating, frequency, hematuria and urgency.   Musculoskeletal:  Negative for arthralgias, joint swelling and myalgias.   Skin:   "Negative for color change.   Allergic/Immunologic: Negative for environmental allergies.   Neurological:  Negative for dizziness, weakness, light-headedness, numbness and headaches.   Hematological:  Negative for adenopathy.   Psychiatric/Behavioral:  Negative for decreased concentration and sleep disturbance. The patient is not nervous/anxious.         Past Medical History:     Past Medical History:   Diagnosis Date    Endometriosis     Pleurisy     LAST ASSESSED: [id number]        Current Medications:     Current Outpatient Medications:     Cholecalciferol (VITAMIN D3) 5000 units CAPS, Take by mouth, Disp: , Rfl:     ergocalciferol (ERGOCALCIFEROL) 59512 units capsule, Take 50,000 Units by mouth (Patient not taking: Reported on 8/22/2024), Disp: , Rfl:      Allergies:     Allergies   Allergen Reactions    Medical Tape Swelling     Denies this allergies  bandaids---redness, itchy site        Physical Exam:     /80 (BP Location: Left arm, Patient Position: Sitting, Cuff Size: Standard)   Pulse 60   Resp 16   Ht 5' 3.75\" (1.619 m)   Wt 65.3 kg (144 lb)   SpO2 99%   BMI 24.91 kg/m²     Physical Exam  Vitals and nursing note reviewed.   Constitutional:       General: She is awake. She is not in acute distress.     Appearance: Normal appearance. She is well-developed, well-groomed and normal weight.   HENT:      Head: Normocephalic and atraumatic.      Right Ear: Hearing and external ear normal.      Left Ear: Hearing and external ear normal.      Nose: Septal deviation present.      Mouth/Throat:      Lips: Pink.      Mouth: Mucous membranes are moist.   Eyes:      General: Lids are normal. Vision grossly intact. Gaze aligned appropriately.      Conjunctiva/sclera: Conjunctivae normal.   Neck:      Vascular: No carotid bruit.      Trachea: Trachea and phonation normal.   Pulmonary:      Effort: Pulmonary effort is normal. No respiratory distress.   Abdominal:      General: Abdomen is protuberant. "   Musculoskeletal:         General: No swelling.      Cervical back: Neck supple.   Skin:     General: Skin is warm.      Capillary Refill: Capillary refill takes less than 2 seconds.   Neurological:      Mental Status: She is alert.   Psychiatric:         Attention and Perception: Attention and perception normal.         Mood and Affect: Mood and affect normal.         Speech: Speech normal.         Behavior: Behavior normal. Behavior is cooperative.         Thought Content: Thought content normal.         Cognition and Memory: Cognition and memory normal.         Judgment: Judgment normal.           Data:     Laboratory Results: I have personally reviewed the pertinent laboratory results/reports   Radiology/Other Diagnostic Testing Results: Results Review Statement: No pertinent imaging studies reviewed.    Shelly Fernandez PA-C  [facility] INTERNAL MEDICINE [address]

## 2024-10-16 ENCOUNTER — TELEPHONE (OUTPATIENT)
Age: 69
End: 2024-10-16

## 2024-10-16 NOTE — TELEPHONE ENCOUNTER
Patient would like to get her results for her Dexa scan because she would like to know if you have any concerns about her test, please advise

## 2024-10-18 NOTE — TELEPHONE ENCOUNTER
Called and left the patient a message about her DEXA scan result as per Dr. Laguerre and left our number for her to call back just in case she has any other questions ir concerns

## 2025-02-26 ENCOUNTER — HOSPITAL ENCOUNTER (OUTPATIENT)
Dept: RADIOLOGY | Facility: HOSPITAL | Age: 70
Discharge: HOME | End: 2025-02-26
Attending: ORTHOPAEDIC SURGERY
Payer: MEDICARE

## 2025-02-26 ENCOUNTER — OFFICE VISIT (OUTPATIENT)
Dept: OBGYN CLINIC | Facility: CLINIC | Age: 70
End: 2025-02-26
Payer: MEDICARE

## 2025-02-26 VITALS — HEIGHT: 63 IN | WEIGHT: 144 LBS | BODY MASS INDEX: 25.52 KG/M2

## 2025-02-26 DIAGNOSIS — M24.812 INTERNAL DERANGEMENT OF SHOULDER, LEFT: Primary | ICD-10-CM

## 2025-02-26 DIAGNOSIS — M25.512 LEFT SHOULDER PAIN, UNSPECIFIED CHRONICITY: ICD-10-CM

## 2025-02-26 PROCEDURE — 73030 X-RAY EXAM OF SHOULDER: CPT

## 2025-02-26 PROCEDURE — 99204 OFFICE O/P NEW MOD 45 MIN: CPT | Performed by: ORTHOPAEDIC SURGERY

## 2025-02-26 NOTE — PROGRESS NOTES
:  Assessment & Plan  Left shoulder pain, unspecified chronicity    Orders:    XR shoulder 2+ vw left; Future    MRI shoulder left wo contrast; Future    Internal derangement of shoulder, left    Orders:    MRI shoulder left wo contrast; Future  X-ray left shoulder was reviewed in the office today   Due to patient having significant findings on exam today and s/p fall  Will be ordering MRI Left shoulder to rule out rotator cuff tear  She is to follow up after the MRI to review results.             Steffanie Hahn  [id number]  1955    ORTHOPAEDIC SURGERY OUTPATIENT NOTE  2/26/2025      HISTORY:  69 y.o. female [place] today evaluation for her left shoulder. Patient is RHD. Patient states she had a fall in September where she feel forward with her arms in a outreach position. Patient at that time was only complaining of bilateral wrist pain. She states she started have left shoulder pain in Jan 2025. She is having pan over anterior and scapula region. Her pain is worse at night and with lifting. She denies numbness or tingling. She has not taken any anti-inflammatories. Patient has hx of Colon cancer.     Past Medical History:   Diagnosis Date    Endometriosis     Pleurisy     LAST ASSESSED: [id number]       Past Surgical History:   Procedure Laterality Date    DILATION AND CURETTAGE OF UTERUS      LAST ASSESSED: 17MAY2016    TONSILLECTOMY         Social History     Socioeconomic History    Marital status: /Civil Union     Spouse name: Not on file    Number of children: Not on file    Years of education: Not on file    Highest education level: Not on file   Occupational History    Not on file   Tobacco Use    Smoking status: Never    Smokeless tobacco: Never   Vaping Use    Vaping status: Never Used   Substance and Sexual Activity    Alcohol use: Never    Drug use: No    Sexual activity: Yes     Partners: Male   Other Topics Concern    Not on file   Social History Narrative    Not on file     Social Drivers  "of Health     Financial Resource Strain: Not on file   Food Insecurity: No Food Insecurity (8/22/2024)    Nursing - Inadequate Food Risk Classification     Worried About Running Out of Food in the Last Year: Never true     Ran Out of Food in the Last Year: Never true     Ran Out of Food in the Last Year: Not on file   Transportation Needs: No Transportation Needs (8/22/2024)    PRAPARE - Transportation     Lack of Transportation (Medical): No     Lack of Transportation (Non-Medical): No   Physical Activity: Sufficiently Active (11/3/2020)    Exercise Vital Sign     Days of Exercise per Week: 7 days     Minutes of Exercise per Session: 30 min   Stress: No Stress Concern Present (11/3/2020)    [employer] [place] [employer] - Occupational Stress Questionnaire     Feeling of Stress : Not at all   Social Connections: Not on file   Intimate Partner Violence: Not on file   Housing Stability: Unknown (8/22/2024)    Housing Stability Vital Sign     Unable to Pay for Housing in the Last Year: No     Number of Times Moved in the Last Year: Not on file     Homeless in the Last Year: No       Family History   Problem Relation Age of Onset    Hypertension Mother     Hypertension Father         Patient's Medications   New Prescriptions    No medications on file   Previous Medications    CHOLECALCIFEROL (VITAMIN D3) 5000 UNITS CAPS    Take by mouth   Modified Medications    No medications on file   Discontinued Medications    No medications on file       Allergies   Allergen Reactions    Medical Tape Swelling     Denies this allergies  bandaids---redness, itchy site        Ht 5' 3\" (1.6 m)   Wt 65.3 kg (144 lb)   BMI 25.51 kg/m²      REVIEW OF SYSTEMS:  Constitutional: Negative.    HEENT: Negative.    Respiratory: Negative.    Skin: Negative.    Neurological: Negative.    Psychiatric/Behavioral: Negative.  Musculoskeletal: Negative except for that mentioned in the HPI.    Gen: No acute distress, resting comfortably in " bed  HEENT: Eyes clear, moist mucus membranes, hearing intact  Respiratory: No audible wheezing or stridor  Cardiovascular: Well Perfused peripherally, 2+ distal pulse  Abdomen: nondistended, no peritoneal signs     PHYSICAL EXAM:    LEFT SHOULDER:    Appearance:     Forward flexion:   180 degrees   Abduction:  180 degrees   External rotation at 90 degrees abduction:   90 degrees   Internal rotation at 90 degrees abduction:  90 degrees   External rotation at 0 degrees:   70 degrees   Internal rotation: T7     STRENGTH:  Forward flexion:  4/5   Abduction:  5/5   External rotation:  4/5   Internal rotation:  5/5        Speed test: Negative  Yergason's: Negative   Tender to palpation ACJ (acromioclavicular joint): Negative   Tender to palpation LHB (long head of biceps): +   Schafer test: Negative  Adair test: +   Hornblower's: Negative  Lift off: Negative  Belly press: Negative  Bear hug: Negative  External lag sign: Negative  Cross-body adduction: Negative  Sulcus sign: Negative  Benton's test: +   Drop arm test: negative    Radial/median/ulnar nerve intact    <2 sec cap refill       IMAGING:  X-ray left shoulder demonstrates mild glenohumeral osteoarthritis and calcification  long head biceps tendon distally on the axially view        Scribe Attestation      I,:  Marce Ornelas MA am acting as a scribe while in the presence of the attending physician.:       I,:  Pilar Madison DO personally performed the services described in this documentation    as scribed in my presence.:

## 2025-03-19 ENCOUNTER — HOSPITAL ENCOUNTER (OUTPATIENT)
Dept: MRI IMAGING | Facility: CLINIC | Age: 70
Discharge: HOME | End: 2025-03-19
Attending: ORTHOPAEDIC SURGERY

## 2025-03-19 ENCOUNTER — OFFICE VISIT (OUTPATIENT)
Age: 70
End: 2025-03-19
Payer: MEDICARE

## 2025-03-19 VITALS
HEIGHT: 64 IN | DIASTOLIC BLOOD PRESSURE: 82 MMHG | BODY MASS INDEX: 25.27 KG/M2 | OXYGEN SATURATION: 99 % | WEIGHT: 148 LBS | SYSTOLIC BLOOD PRESSURE: 126 MMHG | HEART RATE: 61 BPM

## 2025-03-19 DIAGNOSIS — M24.812 INTERNAL DERANGEMENT OF SHOULDER, LEFT: ICD-10-CM

## 2025-03-19 DIAGNOSIS — C18.9 MUCINOUS ADENOCARCINOMA OF COLON (HCC): ICD-10-CM

## 2025-03-19 DIAGNOSIS — M25.512 LEFT SHOULDER PAIN, UNSPECIFIED CHRONICITY: ICD-10-CM

## 2025-03-19 DIAGNOSIS — D50.9 IRON DEFICIENCY ANEMIA, UNSPECIFIED IRON DEFICIENCY ANEMIA TYPE: ICD-10-CM

## 2025-03-19 DIAGNOSIS — K64.4 EXTERNAL HEMORRHOID: Primary | ICD-10-CM

## 2025-03-19 PROCEDURE — 99214 OFFICE O/P EST MOD 30 MIN: CPT

## 2025-03-19 PROCEDURE — G2211 COMPLEX E/M VISIT ADD ON: HCPCS

## 2025-03-19 RX ORDER — HYDROCORTISONE 25 MG/G
CREAM TOPICAL 2 TIMES DAILY
Qty: 28 G | Refills: 1 | Status: SHIPPED | OUTPATIENT
Start: 2025-03-19

## 2025-03-19 NOTE — PROGRESS NOTES
"Name: Steffanie Hahn      : 1955      MRN: [id number]  Encounter Provider: Shelly Fernandez PA-C  Encounter Date: 3/19/2025   Encounter department: [facility] INTERNAL MEDICINE [address]  :  Assessment & Plan  External hemorrhoid  External hemorrhoid noted on exam. Recommended continuing to apply tea tree oil and Anusol twice daily.  Recommended sitz bath's on a daily basis.  Continue to avoid straining by increasing fluid intake and starting a daily fiber supplement.  Follow-up if symptoms persist.  Orders:    hydrocortisone (ANUSOL-HC) 2.5 % rectal cream; Apply topically 2 (two) times a day    Mucinous adenocarcinoma of colon (HCC)  She would like a referral to a [facility] gastroenterologist, referral placed.  Orders:    Ambulatory Referral to Gastroenterology; Future    Iron deficiency anemia, unspecified iron deficiency anemia type    Orders:    Iron Panel (Includes Ferritin, Iron Sat%, Iron, and TIBC); Future           History of Present Illness   Patient is a 70 yo female that presents today for a lump around her rectum. This started 3 days ago.  She noticed some pain with a bowel movement and blood when she wiped.  She denies a history of hemorrhoids.  She denies any recent straining, but does admit to an increase in stress and softer bowel movements.  She has been utilizing tea tree oil with 80% relief.  She denies any fevers, chills, unintentional weight loss, night sweats, abdominal pain, abdominal bloating, nausea, or vomiting.  She denies any blood in the toilet.      Review of Systems   Constitutional:  Negative for chills, diaphoresis, fever and unexpected weight change.   Gastrointestinal:  Positive for diarrhea and rectal pain. Negative for abdominal distention, abdominal pain, blood in stool, constipation, nausea and vomiting.       Objective   /82   Pulse 61   Ht 5' 3.75\" (1.619 m)   Wt 67.1 kg (148 lb)   SpO2 99%   BMI 25.60 kg/m²      Physical Exam  Vitals and " nursing note reviewed.   Constitutional:       General: She is awake. She is not in acute distress.     Appearance: Normal appearance. She is well-developed, well-groomed and normal weight.   HENT:      Head: Normocephalic and atraumatic.      Right Ear: Hearing and external ear normal.      Left Ear: Hearing and external ear normal.      Nose: Nose normal.      Mouth/Throat:      Lips: Pink.      Mouth: Mucous membranes are moist.   Eyes:      General: Lids are normal. Vision grossly intact. Gaze aligned appropriately.      Conjunctiva/sclera: Conjunctivae normal.   Neck:      Vascular: No carotid bruit.      Trachea: Trachea and phonation normal.   Pulmonary:      Effort: Pulmonary effort is normal. No respiratory distress.   Abdominal:      General: Abdomen is flat.   Genitourinary:     Rectum: External hemorrhoid present.   Musculoskeletal:         General: No swelling.      Cervical back: Neck supple.   Skin:     General: Skin is warm.      Capillary Refill: Capillary refill takes less than 2 seconds.   Neurological:      Mental Status: She is alert.   Psychiatric:         Attention and Perception: Attention and perception normal.         Mood and Affect: Mood and affect normal.         Speech: Speech normal.         Behavior: Behavior normal. Behavior is cooperative.         Thought Content: Thought content normal.         Cognition and Memory: Cognition and memory normal.         Judgment: Judgment normal.

## 2025-03-19 NOTE — ASSESSMENT & PLAN NOTE
She would like a referral to a [facility] gastroenterologist, referral placed.  Orders:    Ambulatory Referral to Gastroenterology; Future

## 2025-03-28 ENCOUNTER — TELEPHONE (OUTPATIENT)
Age: 70
End: 2025-03-28

## 2025-03-28 DIAGNOSIS — M24.812 INTERNAL DERANGEMENT OF LEFT SHOULDER: Primary | ICD-10-CM

## 2025-03-28 NOTE — TELEPHONE ENCOUNTER
Caller: Steffanie    Doctor: Dr. Madison    Reason for call: Tried to do the MRI and could not tolerate the machine.  She wants to know what else she can do.  She said shoulder is not hurting as much unless at rest and when laying down to sleep.  Asking if she can do PT before she travels from [place] to the appt.    Call back#: [phone]

## 2025-03-31 NOTE — TELEPHONE ENCOUNTER
FLORENCIAM for pt. To set up PT, gave her number for PT ([phone]) and also gave her the number for central scheduling to schedule US ([phone]). Told pt. After she scheduled the US, please call our central line ([phone]) and schedule a f/u to review the results in office. Thank you.

## 2025-04-03 ENCOUNTER — APPOINTMENT (OUTPATIENT)
Dept: LAB | Facility: CLINIC | Age: 70
End: 2025-04-03
Payer: MEDICARE

## 2025-04-03 DIAGNOSIS — D50.9 IRON DEFICIENCY ANEMIA, UNSPECIFIED IRON DEFICIENCY ANEMIA TYPE: ICD-10-CM

## 2025-04-03 DIAGNOSIS — R53.83 OTHER FATIGUE: ICD-10-CM

## 2025-04-03 DIAGNOSIS — Z13.220 SCREENING FOR LIPID DISORDERS: ICD-10-CM

## 2025-04-03 DIAGNOSIS — E03.8 SUBCLINICAL HYPOTHYROIDISM: ICD-10-CM

## 2025-04-03 DIAGNOSIS — R73.03 PREDIABETES: ICD-10-CM

## 2025-04-03 LAB
ALBUMIN SERPL BCG-MCNC: 4.7 G/DL (ref 3.5–5)
ALP SERPL-CCNC: 69 U/L (ref 34–104)
ALT SERPL W P-5'-P-CCNC: 17 U/L (ref 7–52)
ANION GAP SERPL CALCULATED.3IONS-SCNC: 9 MMOL/L (ref 4–13)
AST SERPL W P-5'-P-CCNC: 23 U/L (ref 13–39)
BASOPHILS # BLD AUTO: 0.06 THOUSANDS/ÂΜL (ref 0–0.1)
BASOPHILS NFR BLD AUTO: 1 % (ref 0–1)
BILIRUB SERPL-MCNC: 0.93 MG/DL (ref 0.2–1)
BUN SERPL-MCNC: 16 MG/DL (ref 5–25)
CALCIUM SERPL-MCNC: 9.7 MG/DL (ref 8.4–10.2)
CHLORIDE SERPL-SCNC: 104 MMOL/L (ref 96–108)
CHOLEST SERPL-MCNC: 155 MG/DL (ref ?–200)
CO2 SERPL-SCNC: 30 MMOL/L (ref 21–32)
CREAT SERPL-MCNC: 0.65 MG/DL (ref 0.6–1.3)
EOSINOPHIL # BLD AUTO: 0.13 THOUSAND/ÂΜL (ref 0–0.61)
EOSINOPHIL NFR BLD AUTO: 2 % (ref 0–6)
ERYTHROCYTE [DISTWIDTH] IN BLOOD BY AUTOMATED COUNT: 13.2 % (ref 11.6–15.1)
EST. AVERAGE GLUCOSE BLD GHB EST-MCNC: 117 MG/DL
FERRITIN SERPL-MCNC: 44 NG/ML (ref 11–307)
GFR SERPL CREATININE-BSD FRML MDRD: 92 ML/MIN/1.73SQ M
GLUCOSE P FAST SERPL-MCNC: 85 MG/DL (ref 65–99)
HBA1C MFR BLD: 5.7 %
HCT VFR BLD AUTO: 47.1 % (ref 34.8–46.1)
HDLC SERPL-MCNC: 74 MG/DL
HGB BLD-MCNC: 15 G/DL (ref 11.5–15.4)
IMM GRANULOCYTES # BLD AUTO: 0.01 THOUSAND/UL (ref 0–0.2)
IMM GRANULOCYTES NFR BLD AUTO: 0 % (ref 0–2)
IRON SATN MFR SERPL: 18 % (ref 15–50)
IRON SERPL-MCNC: 76 UG/DL (ref 50–212)
LDLC SERPL CALC-MCNC: 69 MG/DL (ref 0–100)
LYMPHOCYTES # BLD AUTO: 1.53 THOUSANDS/ÂΜL (ref 0.6–4.47)
LYMPHOCYTES NFR BLD AUTO: 29 % (ref 14–44)
MCH RBC QN AUTO: 30.4 PG (ref 26.8–34.3)
MCHC RBC AUTO-ENTMCNC: 31.8 G/DL (ref 31.4–37.4)
MCV RBC AUTO: 96 FL (ref 82–98)
MONOCYTES # BLD AUTO: 0.46 THOUSAND/ÂΜL (ref 0.17–1.22)
MONOCYTES NFR BLD AUTO: 9 % (ref 4–12)
NEUTROPHILS # BLD AUTO: 3.12 THOUSANDS/ÂΜL (ref 1.85–7.62)
NEUTS SEG NFR BLD AUTO: 59 % (ref 43–75)
NONHDLC SERPL-MCNC: 81 MG/DL
NRBC BLD AUTO-RTO: 0 /100 WBCS
PLATELET # BLD AUTO: 265 THOUSANDS/UL (ref 149–390)
PMV BLD AUTO: 12.7 FL (ref 8.9–12.7)
POTASSIUM SERPL-SCNC: 3.9 MMOL/L (ref 3.5–5.3)
PROT SERPL-MCNC: 6.9 G/DL (ref 6.4–8.4)
RBC # BLD AUTO: 4.93 MILLION/UL (ref 3.81–5.12)
SODIUM SERPL-SCNC: 143 MMOL/L (ref 135–147)
T4 FREE SERPL-MCNC: 1.02 NG/DL (ref 0.61–1.12)
TIBC SERPL-MCNC: 429.8 UG/DL (ref 250–450)
TRANSFERRIN SERPL-MCNC: 307 MG/DL (ref 203–362)
TRIGL SERPL-MCNC: 60 MG/DL (ref ?–150)
TSH SERPL DL<=0.05 MIU/L-ACNC: 4.6 UIU/ML (ref 0.45–4.5)
UIBC SERPL-MCNC: 354 UG/DL (ref 155–355)
WBC # BLD AUTO: 5.31 THOUSAND/UL (ref 4.31–10.16)

## 2025-04-03 PROCEDURE — 85025 COMPLETE CBC W/AUTO DIFF WBC: CPT

## 2025-04-03 PROCEDURE — 84439 ASSAY OF FREE THYROXINE: CPT

## 2025-04-03 PROCEDURE — 84443 ASSAY THYROID STIM HORMONE: CPT

## 2025-04-03 PROCEDURE — 36415 COLL VENOUS BLD VENIPUNCTURE: CPT

## 2025-04-03 PROCEDURE — 83540 ASSAY OF IRON: CPT

## 2025-04-03 PROCEDURE — 83550 IRON BINDING TEST: CPT

## 2025-04-03 PROCEDURE — 82728 ASSAY OF FERRITIN: CPT

## 2025-04-03 PROCEDURE — 83036 HEMOGLOBIN GLYCOSYLATED A1C: CPT

## 2025-04-03 PROCEDURE — 80053 COMPREHEN METABOLIC PANEL: CPT

## 2025-04-03 PROCEDURE — 80061 LIPID PANEL: CPT

## 2025-04-16 ENCOUNTER — EVALUATION (OUTPATIENT)
Dept: PHYSICAL THERAPY | Facility: CLINIC | Age: 70
End: 2025-04-16
Payer: MEDICARE

## 2025-04-16 DIAGNOSIS — M24.812 INTERNAL DERANGEMENT OF LEFT SHOULDER: ICD-10-CM

## 2025-04-16 PROBLEM — Z80.3 FAMILY HISTORY OF BREAST CANCER: Status: RESOLVED | Noted: 2021-10-20 | Resolved: 2025-04-16

## 2025-04-16 PROBLEM — Z80.42 FAMILY HISTORY OF PROSTATE CANCER: Status: RESOLVED | Noted: 2021-10-20 | Resolved: 2025-04-16

## 2025-04-16 PROBLEM — L03.313 CELLULITIS OF CHEST WALL: Status: RESOLVED | Noted: 2022-08-01 | Resolved: 2025-04-16

## 2025-04-16 PROBLEM — Z80.8 FAMILY HISTORY OF MELANOMA: Status: RESOLVED | Noted: 2021-10-20 | Resolved: 2025-04-16

## 2025-04-16 PROBLEM — R73.01 IMPAIRED FASTING GLUCOSE: Status: RESOLVED | Noted: 2017-03-07 | Resolved: 2025-04-16

## 2025-04-16 PROBLEM — K31.7 GASTRIC POLYPS: Status: RESOLVED | Noted: 2021-08-25 | Resolved: 2025-04-16

## 2025-04-16 PROBLEM — R79.89 LOW VITAMIN D LEVEL: Status: RESOLVED | Noted: 2017-03-07 | Resolved: 2025-04-16

## 2025-04-16 PROBLEM — R79.89 LOW VITAMIN D LEVEL: Status: ACTIVE | Noted: 2017-03-07

## 2025-04-16 PROCEDURE — 97161 PT EVAL LOW COMPLEX 20 MIN: CPT | Performed by: PHYSICAL THERAPIST

## 2025-04-16 PROCEDURE — 97110 THERAPEUTIC EXERCISES: CPT | Performed by: PHYSICAL THERAPIST

## 2025-04-16 NOTE — PROGRESS NOTES
PT Evaluation     Today's date: 2025  Patient name: Steffanie Hahn  : 1955  MRN: [id number]  Referring provider: Sergio Waters PA-C  Dx:   Encounter Diagnosis     ICD-10-CM    1. Internal derangement of left shoulder  M24.812 Ambulatory referral to Physical Therapy                     Assessment  Impairments: abnormal or restricted ROM, activity intolerance, impaired physical strength and pain with function    Assessment details: Pt is a 69 y.o. female who presents to physical therapy with primary nociceptive pain associated with L shoulder pain with mobility deficits in the environment of decreased cervical mobility. Pt does not present with any red flag symptoms at this time. Pt presents with reduced cervical ROM, decreased cervical joint mobility, decreased shoulder ROM and decreased shoulder strength. Education provided regarding POC, and prognosis, pt verablized understanding. Pt would benefit from skilled physical therapy in order to decrease deficits and return to prior level of function.    Understanding of Dx/Px/POC: good    Goals  STG (4 weeks):  Pt will be independent with HEP.  Pt will demonstrate L cervical ROT to >50d.  Pt will demonstrate increase in MMT grade by 1/3 for ER.    LTG (8 weeks):  FOTO will be expected outcome.   Pt will report no pain when sleeping.  Pt will demonstrate no pain with horizontal abduction L shoulder AROM.       Plan  Patient would benefit from: skilled physical therapy  Planned modality interventions: cryotherapy    Planned therapy interventions: manual therapy, neuromuscular re-education, patient education, self care, strengthening, stretching, therapeutic activities, therapeutic exercise and home exercise program    Frequency: 1-2x/week.  Duration in weeks: 8  Treatment plan discussed with: patient    Subjective Evaluation    History of Present Illness  Mechanism of injury: Chief Complaint: Pt reports in September that she tripped over a hose and fell  with outstretched arms. She reports hurting her nose and her L shoulder/neck area. She states that it continued to be painful and then she began to get L elbow pain a few months later. She did see an acupuncturist in February. She reports that her L neck pain resolved but her shoulder didn't change. She reports that there has been no improvement.     Severity: low  Irritability: low  Nature: nociceptive vs neuropathic  Stage: chronic  Stability:no change    P1: see body chart  Patient Goals  Patient goals for therapy: decreased pain  Patient goal: be able to sleep    Objective     Observations     Additional Observation Details  Depression of the L scapula, decreased L UT hypertrophy    Cervical/Thoracic Screen   Cervical range of motion within normal limits with the following exceptions: L ROT limited compared to the R  Decreased L side glide at C5-7    Neurological Testing     Sensation     Shoulder   Left Shoulder   Intact: light touch    Right Shoulder   Intact: Light touch    Reflexes   Left   Biceps (C5/C6): normal (2+)  Brachioradialis (C6): normal (2+)  Triceps (C7): normal (2+)    Right   Biceps (C5/C6): normal (2+)  Brachioradialis (C6): normal (2+)  Triceps (C7): normal (2+)    Additional Neurological Details  Myotome WNL    Active Range of Motion   Left Shoulder   Flexion: 130 degrees   Abduction: WFL  External rotation 0°: WFL  Horizontal abduction: WFL and with pain  Horizontal adduction: WFL    Right Shoulder   Normal active range of motion    Strength/Myotome Testing     Left Shoulder     Planes of Motion   External rotation at 0°: 4+     Right Shoulder     Planes of Motion   External rotation at 0°: 5            Precautions: hx of colon cancer (remission for 9 years)    POC expires Unit limit Auth Expiration date PT/OT/ST + Visit Limit?   6/11/25                              Visit/Unit Tracking  AUTH Status:  Date 4/16               Used                Remaining                        Manuals 4/16             CPA/UPA             GHJ mob                                       Neuro Re-Ed                                                                                                        Ther Ex             Pt edu ESTELA                                                                                                       Ther Activity                                       Gait Training                                       Modalities

## 2025-04-17 ENCOUNTER — OFFICE VISIT (OUTPATIENT)
Age: 70
End: 2025-04-17
Payer: MEDICARE

## 2025-04-17 VITALS
DIASTOLIC BLOOD PRESSURE: 84 MMHG | SYSTOLIC BLOOD PRESSURE: 126 MMHG | WEIGHT: 148 LBS | OXYGEN SATURATION: 98 % | BODY MASS INDEX: 25.27 KG/M2 | HEIGHT: 64 IN | HEART RATE: 68 BPM | RESPIRATION RATE: 18 BRPM

## 2025-04-17 DIAGNOSIS — C18.9 MUCINOUS ADENOCARCINOMA OF COLON (HCC): ICD-10-CM

## 2025-04-17 DIAGNOSIS — Z12.31 ENCOUNTER FOR SCREENING MAMMOGRAM FOR BREAST CANCER: ICD-10-CM

## 2025-04-17 DIAGNOSIS — R53.83 OTHER FATIGUE: ICD-10-CM

## 2025-04-17 DIAGNOSIS — E03.8 SUBCLINICAL HYPOTHYROIDISM: ICD-10-CM

## 2025-04-17 DIAGNOSIS — Z13.220 SCREENING FOR LIPID DISORDERS: ICD-10-CM

## 2025-04-17 DIAGNOSIS — R73.03 PREDIABETES: ICD-10-CM

## 2025-04-17 DIAGNOSIS — E21.3 HYPERPARATHYROIDISM (HCC): ICD-10-CM

## 2025-04-17 DIAGNOSIS — R91.1 PULMONARY NODULE: Primary | ICD-10-CM

## 2025-04-17 DIAGNOSIS — E55.9 MILD VITAMIN D DEFICIENCY: ICD-10-CM

## 2025-04-17 DIAGNOSIS — D50.9 IRON DEFICIENCY ANEMIA, UNSPECIFIED IRON DEFICIENCY ANEMIA TYPE: ICD-10-CM

## 2025-04-17 DIAGNOSIS — E04.1 NONTOXIC SINGLE THYROID NODULE: ICD-10-CM

## 2025-04-17 PROCEDURE — G2211 COMPLEX E/M VISIT ADD ON: HCPCS

## 2025-04-17 PROCEDURE — 99214 OFFICE O/P EST MOD 30 MIN: CPT

## 2025-04-17 NOTE — ASSESSMENT & PLAN NOTE
DEXA scan in 2024 was unremarkable. Kidney function and calcium are stable. Recommended obtaining PTH, phos, vit d, and calcium with next set of labs.   Orders:    Phosphorus; Future    PTH, intact; Future    Vitamin D 25 hydroxy; Future    Calcium, ionized; Future

## 2025-04-17 NOTE — ASSESSMENT & PLAN NOTE
BP in office is 126/84.  Not currently on a medication at this time.  No home BPs.  Limit salt intake less than 2000 mg daily.

## 2025-04-17 NOTE — PROGRESS NOTES
Name: Steffanie Hahn      : 1955      MRN: [id number]  Encounter Provider: Shelly Fernandez PA-C  Encounter Date: 2025   Encounter department: [facility]  :  Assessment & Plan  Pulmonary nodule  CT chest from 22 revealed several tiny posterior lower lobe pulmonary nodules that require no f/up        Mucinous adenocarcinoma of colon (HCC)  S/p right hemicolectomy in .  She is due for repeat colonoscopy 10/2026.  She is followed by [facility] in [place].       Hyperparathyroidism (HCC)  DEXA scan in  was unremarkable. Kidney function and calcium are stable. Recommended obtaining PTH, phos, vit d, and calcium with next set of labs.   Orders:    Phosphorus; Future    PTH, intact; Future    Vitamin D 25 hydroxy; Future    Calcium, ionized; Future    Subclinical hypothyroidism  TSH is elevated, but T4 is normal. Will continue to monitor.   Orders:    TSH, 3rd generation with Free T4 reflex; Future    Nontoxic single thyroid nodule  Ultrasound of thyroid from  revealed a normal study without thyroid nodules present.       Iron deficiency anemia, unspecified iron deficiency anemia type  CBC is stable, ferritin is 44. She has a history of DWAINE noted for at least the past 6 years due to colorectal cancer and diet. Recommended starting an iron supplement qod with vitamin C supplement. Reviewed potential adverse effects. She would like to defer until she sees GI next week. Due for colonoscopy this summer.   Orders:    CBC and differential; Future    Iron Panel (Includes Ferritin, Iron Sat%, Iron, and TIBC); Future    Prediabetes  A1c is 5.7.  Reviewed a low sugar and carb diet.  Orders:    Hemoglobin A1C; Future    Comprehensive metabolic panel; Future    Mild vitamin D deficiency  Currently on a daily vitamin D supplement.   Orders:    Vitamin D 25 hydroxy; Future    Encounter for screening mammogram for breast cancer  Due for mammogram.  Orders:     Mammo screening bilateral w 3d and cad; Future    Screening for lipid disorders    Orders:    Lipid panel; Future    Other fatigue    Orders:    CBC and differential; Future          Falls Plan of Care: balance, strength, and gait training instructions were provided.       History of Present Illness   Patient is a 69-year-old female that presents today for 6-month follow-up.  She has no new complaints today.    She eats a well-balanced diet of fruits, veggies, and lean meats. Recommended increasing water intake, you should be urinating pale to clear yellow every 2-3 hours. Recommended moderate intensity exercise 30 mins 5x a week. She sleeps well. She denies any alcohol, tobacco, or illicit drug use. She is UTD with dentist and eye doctor. She lives at home with her , Kike. She is a  in [place] and splits her time between here and there.     Health maintenance:  Due for mammogram, up-to-date on labs.  Immunizations: Due for shingles, pneumonia, and tetanus vaccines.      Review of Systems   Constitutional:  Negative for chills, fatigue and fever.   HENT:  Negative for ear discharge, ear pain, postnasal drip, rhinorrhea, sinus pressure, sinus pain, sore throat, tinnitus and trouble swallowing.    Eyes:  Negative for pain, discharge and itching.   Respiratory:  Negative for cough, shortness of breath and wheezing.    Cardiovascular:  Negative for chest pain, palpitations and leg swelling.   Gastrointestinal:  Negative for abdominal pain, constipation, diarrhea, nausea and vomiting.   Endocrine: Negative for polydipsia, polyphagia and polyuria.   Genitourinary:  Negative for difficulty urinating, frequency, hematuria and urgency.   Musculoskeletal:  Negative for arthralgias, joint swelling and myalgias.   Skin:  Negative for color change.   Allergic/Immunologic: Negative for environmental allergies.   Neurological:  Negative for dizziness, weakness, light-headedness, numbness and headaches.  "  Hematological:  Negative for adenopathy.   Psychiatric/Behavioral:  Negative for decreased concentration and sleep disturbance. The patient is not nervous/anxious.        Objective   /84 (BP Location: Left arm, Patient Position: Sitting, Cuff Size: Standard)   Pulse 68   Resp 18   Ht 5' 3.75\" (1.619 m)   Wt 67.1 kg (148 lb)   SpO2 98%   BMI 25.60 kg/m²      Physical Exam  Vitals and nursing note reviewed.   Constitutional:       General: She is awake. She is not in acute distress.     Appearance: Normal appearance. She is well-developed, well-groomed and normal weight.   HENT:      Head: Normocephalic and atraumatic.      Right Ear: Hearing and external ear normal.      Left Ear: Hearing and external ear normal.      Nose: Nose normal.      Mouth/Throat:      Lips: Pink.      Mouth: Mucous membranes are moist.   Eyes:      General: Lids are normal. Vision grossly intact. Gaze aligned appropriately.      Conjunctiva/sclera: Conjunctivae normal.   Neck:      Vascular: No carotid bruit.      Trachea: Trachea and phonation normal.   Cardiovascular:      Rate and Rhythm: Normal rate and regular rhythm.      Heart sounds: Normal heart sounds, S1 normal and S2 normal. No murmur heard.     No friction rub. No gallop.   Pulmonary:      Effort: Pulmonary effort is normal. No respiratory distress.      Breath sounds: Normal breath sounds and air entry. No decreased breath sounds, wheezing, rhonchi or rales.   Abdominal:      General: Abdomen is flat.   Musculoskeletal:         General: No swelling.      Cervical back: Neck supple.      Right lower leg: No edema.      Left lower leg: No edema.   Skin:     General: Skin is warm.      Capillary Refill: Capillary refill takes less than 2 seconds.   Neurological:      Mental Status: She is alert.   Psychiatric:         Attention and Perception: Attention and perception normal.         Mood and Affect: Mood and affect normal.         Speech: Speech normal.         " Behavior: Behavior normal. Behavior is cooperative.         Thought Content: Thought content normal.         Cognition and Memory: Cognition and memory normal.         Judgment: Judgment normal.

## 2025-04-17 NOTE — ASSESSMENT & PLAN NOTE
CBC is stable, ferritin is 44. She has a history of DWAINE noted for at least the past 6 years due to colorectal cancer and diet. Recommended starting an iron supplement qod with vitamin C supplement. Reviewed potential adverse effects. She would like to defer until she sees GI next week. Due for colonoscopy this summer.   Orders:    CBC and differential; Future    Iron Panel (Includes Ferritin, Iron Sat%, Iron, and TIBC); Future

## 2025-04-17 NOTE — ASSESSMENT & PLAN NOTE
S/p right hemicolectomy in 2016.  She is due for repeat colonoscopy 10/2026.  She is followed by [facility] in [place].

## 2025-04-17 NOTE — ASSESSMENT & PLAN NOTE
A1c is 5.7.  Reviewed a low sugar and carb diet.  Orders:    Hemoglobin A1C; Future    Comprehensive metabolic panel; Future

## 2025-04-17 NOTE — ASSESSMENT & PLAN NOTE
CT chest from 8/1/22 revealed several tiny posterior lower lobe pulmonary nodules that require no f/up         Age appropriate

## 2025-04-17 NOTE — ASSESSMENT & PLAN NOTE
TSH is elevated, but T4 is normal. Will continue to monitor.   Orders:    TSH, 3rd generation with Free T4 reflex; Future

## 2025-04-22 ENCOUNTER — TELEPHONE (OUTPATIENT)
Age: 70
End: 2025-04-22

## 2025-04-22 NOTE — TELEPHONE ENCOUNTER
Claudia from [facility] is calling that patient reached out to them about her lab results and they wouldn't go over her lab because they didn't order them. But the patient was asking about her low Vit D & being anemia. She didn't have a vit d lab done on 4/3/2025.    Please call patient at  [phone]

## 2025-05-07 ENCOUNTER — HOSPITAL ENCOUNTER (OUTPATIENT)
Dept: ULTRASOUND IMAGING | Facility: HOSPITAL | Age: 70
Discharge: HOME | End: 2025-05-07
Payer: MEDICARE

## 2025-05-07 DIAGNOSIS — M24.812 INTERNAL DERANGEMENT OF LEFT SHOULDER: ICD-10-CM

## 2025-05-07 PROCEDURE — 76882 US LMTD JT/FCL EVL NVASC XTR: CPT

## 2025-05-14 ENCOUNTER — EVALUATION (OUTPATIENT)
Dept: PHYSICAL THERAPY | Facility: CLINIC | Age: 70
End: 2025-05-14
Attending: PHYSICIAN ASSISTANT
Payer: MEDICARE

## 2025-05-14 DIAGNOSIS — M24.812 INTERNAL DERANGEMENT OF LEFT SHOULDER: Primary | ICD-10-CM

## 2025-05-14 PROCEDURE — 97110 THERAPEUTIC EXERCISES: CPT | Performed by: PHYSICAL THERAPIST

## 2025-05-14 PROCEDURE — 97140 MANUAL THERAPY 1/> REGIONS: CPT | Performed by: PHYSICAL THERAPIST

## 2025-05-14 NOTE — PROGRESS NOTES
PT Re-evaluation     Today's date: 2025  Patient name: Steffanie Hahn  : 1955  MRN: [id number]  Referring provider: Sergio Waters PA-C  Dx:   Encounter Diagnosis     ICD-10-CM    1. Internal derangement of left shoulder  M24.812 Ambulatory referral to Physical Therapy                     Assessment  Impairments: abnormal or restricted ROM, activity intolerance, impaired physical strength and pain with function    Assessment details: Pt is a 69 y.o. female who presents to physical therapy with primary nociceptive pain associated with L shoulder pain with mobility deficits in the environment of decreased cervical mobility. Pt does not present with any red flag symptoms at this time. Pt has not been seen since IE. Pt continues to present with reduced cervical ROM, decreased cervical joint mobility, decreased shoulder ROM and decreased shoulder strength. Pt would benefit from skilled physical therapy in order to decrease deficits and return to prior level of function.    Understanding of Dx/Px/POC: good    Goals  STG (4 weeks): - ongoing  Pt will be independent with HEP.  Pt will demonstrate L cervical ROT to >50d.  Pt will demonstrate increase in MMT grade by 1/3 for ER.    LTG (8 weeks): - ongoing  FOTO will be expected outcome.   Pt will report no pain when sleeping.  Pt will demonstrate no pain with horizontal abduction L shoulder AROM.       Plan  Patient would benefit from: skilled physical therapy  Planned modality interventions: cryotherapy    Planned therapy interventions: manual therapy, neuromuscular re-education, patient education, self care, strengthening, stretching, therapeutic activities, therapeutic exercise and home exercise program    Frequency: 1-2x/week.  Duration in weeks: 8  Treatment plan discussed with: patient    Subjective Evaluation    History of Present Illness  Mechanism of injury: Chief Complaint: Pt reports in September that she tripped over a hose and fell with  outstretched arms. She reports hurting her nose and her L shoulder/neck area. She states that it continued to be painful and then she began to get L elbow pain a few months later. She did see an acupuncturist in February. She reports that her L neck pain resolved but her shoulder didn't change. She reports that there has been no improvement.     (5/14): Pt has not been seen since IE. US in the meantime, demonstrated some small tearing of the L supraspinatus as well as tendinosis.     Severity: low  Irritability: low  Nature: nociceptive vs neuropathic  Stage: chronic  Stability:no change    P1: see body chart  Patient Goals  Patient goals for therapy: decreased pain  Patient goal: be able to sleep    Objective     Observations     Additional Observation Details  Depression of the L scapula, decreased L UT hypertrophy    Cervical/Thoracic Screen   Cervical L ROT 52d with pain in the L lower cervical spine  Cervical extension 8/10 pain in the lower cervical spine  Cervical range of motion within normal limits with the following exceptions: L ROT limited compared to the R  Decreased L side glide at C5-7    Neurological Testing     Sensation     Shoulder   Left Shoulder   Intact: light touch    Right Shoulder   Intact: Light touch    Reflexes   Left   Biceps (C5/C6): normal (2+)  Brachioradialis (C6): normal (2+)  Triceps (C7): normal (2+)    Right   Biceps (C5/C6): normal (2+)  Brachioradialis (C6): normal (2+)  Triceps (C7): normal (2+)    Additional Neurological Details  Myotome WNL    Active Range of Motion   Left Shoulder   Flexion: 130 degrees (6/10 pain)  Abduction: WFL  External rotation 0°: WFL  Horizontal abduction: WFL and with pain  Horizontal adduction: WFL    Right Shoulder   Normal active range of motion    Strength/Myotome Testing     Left Shoulder     Planes of Motion   External rotation at 0°: 4+     Right Shoulder     Planes of Motion   External rotation at 0°: 5            Precautions: hx of colon  cancer (remission for 9 years)    POC expires Unit limit Auth Expiration date PT/OT/ST + Visit Limit?   6/11/25                              Visit/Unit Tracking  AUTH Status:  Date 4/16               Used                Remaining                        Manuals 4/16 5/14           CPA/UPA  L C6 UPA gd 4- to 4+           GHJ mob                                       Neuro Re-Ed                                                                                                        Ther Ex             Pt edu ESTELA ESTELA           Supine cervical retraction  20x           Seated cervical L ROT with self OP  10x HEP                                                                            Ther Activity                                       Gait Training                                       Modalities

## 2025-05-14 NOTE — HOME EXERCISE EDUCATION
Program_ID:[id number]   Access Code: [email or web address]  URL: [email or web address]  Date: 05-  Prepared By: Joaquin Monroy    Program Notes      Exercises      - Standing Cervical Rotation AROM with Overpressure - 3 x daily - 7 x weekly - 1 sets - 10 reps

## 2025-05-21 ENCOUNTER — OFFICE VISIT (OUTPATIENT)
Dept: PHYSICAL THERAPY | Facility: CLINIC | Age: 70
End: 2025-05-21
Attending: PHYSICIAN ASSISTANT
Payer: MEDICARE

## 2025-05-21 DIAGNOSIS — M24.812 INTERNAL DERANGEMENT OF LEFT SHOULDER: Primary | ICD-10-CM

## 2025-05-21 PROCEDURE — 97140 MANUAL THERAPY 1/> REGIONS: CPT | Performed by: PHYSICAL THERAPIST

## 2025-05-21 PROCEDURE — 97110 THERAPEUTIC EXERCISES: CPT | Performed by: PHYSICAL THERAPIST

## 2025-05-28 ENCOUNTER — OFFICE VISIT (OUTPATIENT)
Dept: PHYSICAL THERAPY | Facility: CLINIC | Age: 70
End: 2025-05-28
Attending: PHYSICIAN ASSISTANT
Payer: MEDICARE

## 2025-05-28 DIAGNOSIS — M24.812 INTERNAL DERANGEMENT OF LEFT SHOULDER: Primary | ICD-10-CM

## 2025-05-28 PROCEDURE — 97110 THERAPEUTIC EXERCISES: CPT | Performed by: PHYSICAL THERAPIST

## 2025-05-28 NOTE — PROGRESS NOTES
Daily Note     Today's date: 2025  Patient name: Steffanie Hahn  : 1955  MRN: [id number]  Referring provider: Sergio Waters PA-C  Dx:   Encounter Diagnosis     ICD-10-CM    1. Internal derangement of left shoulder  M24.812                      Subjective: Pt reports that her shoulder is feeling much better compared to last visit. She states that she continues to complete HEP.       Objective: See treatment diary below      Assessment: Tolerated treatment well. Tried progression of exercise today in clinic. Unable to demonstrate any improvement with progressions or other variations of exercise today. Therefore, seeing as patient finds benefit with current exercise and particularly increased volume, changed dosage for home. Patient would benefit from continued PT      Plan: Continue per plan of care.  Progress treatment as tolerated.       Precautions: hx of colon cancer (remission for 9 years)    POC expires Unit limit Auth Expiration date PT/OT/ST + Visit Limit?   25                              Visit/Unit Tracking  AUTH Status:  Date                Used                Remaining                    Manuals          CPA/UPA  L C6 UPA gd 4- to 4+ L C6 UPA gd 4- to 4+          GHJ mob   Post ESTELA gd 4-                                    Neuro Re-Ed                                                                                                        Ther Ex             Pt edu ESTELA ESTELA ESTELA ESTELA         Supine cervical retraction  20x           Seated cervical L ROT with self OP  10x HEP           Banded ER   YTB 2x10 YTB 3x10         Banded IR    BTB 3x10         Banded row    BTB 2x10                                   Ther Activity                                       Gait Training                                       Modalities

## 2025-07-03 ENCOUNTER — OFFICE VISIT (OUTPATIENT)
Age: 70
End: 2025-07-03
Payer: MEDICARE

## 2025-07-03 ENCOUNTER — APPOINTMENT (OUTPATIENT)
Age: 70
End: 2025-07-03
Payer: MEDICARE

## 2025-07-03 VITALS
TEMPERATURE: 98.4 F | BODY MASS INDEX: 25.61 KG/M2 | RESPIRATION RATE: 18 BRPM | WEIGHT: 150 LBS | DIASTOLIC BLOOD PRESSURE: 78 MMHG | HEIGHT: 64 IN | SYSTOLIC BLOOD PRESSURE: 118 MMHG | OXYGEN SATURATION: 99 % | HEART RATE: 69 BPM

## 2025-07-03 DIAGNOSIS — R73.03 PREDIABETES: Primary | ICD-10-CM

## 2025-07-03 DIAGNOSIS — E21.3 HYPERPARATHYROIDISM (HCC): ICD-10-CM

## 2025-07-03 DIAGNOSIS — E03.8 SUBCLINICAL HYPOTHYROIDISM: Chronic | ICD-10-CM

## 2025-07-03 DIAGNOSIS — Z85.038 HISTORY OF MALIGNANT NEOPLASM OF COLON: ICD-10-CM

## 2025-07-03 DIAGNOSIS — Z12.31 ENCOUNTER FOR SCREENING MAMMOGRAM FOR BREAST CANCER: ICD-10-CM

## 2025-07-03 PROBLEM — I10 PRIMARY HYPERTENSION: Status: RESOLVED | Noted: 2022-07-22 | Resolved: 2025-07-03

## 2025-07-03 PROBLEM — K64.4 EXTERNAL HEMORRHOIDS: Status: ACTIVE | Noted: 2025-07-03

## 2025-07-03 LAB
PTH-INTACT SERPL-MCNC: 73.4 PG/ML (ref 12–88)
TSH SERPL DL<=0.05 MIU/L-ACNC: 2.77 UIU/ML (ref 0.45–4.5)

## 2025-07-03 PROCEDURE — 99214 OFFICE O/P EST MOD 30 MIN: CPT | Performed by: INTERNAL MEDICINE

## 2025-07-03 PROCEDURE — 84443 ASSAY THYROID STIM HORMONE: CPT

## 2025-07-03 PROCEDURE — 84445 ASSAY OF TSI GLOBULIN: CPT

## 2025-07-03 PROCEDURE — 86376 MICROSOMAL ANTIBODY EACH: CPT

## 2025-07-03 PROCEDURE — 83970 ASSAY OF PARATHORMONE: CPT

## 2025-07-03 PROCEDURE — 36415 COLL VENOUS BLD VENIPUNCTURE: CPT

## 2025-07-03 PROCEDURE — G2211 COMPLEX E/M VISIT ADD ON: HCPCS | Performed by: INTERNAL MEDICINE

## 2025-07-03 NOTE — PROGRESS NOTES
Name: Steffanie Hahn      : 1955      MRN: [id number]  Encounter Provider: Joaquin Barillas DO  Encounter Date: 7/3/2025   Encounter department: [facility] [place]    :  Assessment & Plan  Prediabetes    Most recent A1c was 5.7 % on 4/3/2025. Continue healthy lifestyle and avoiding refined carbohydrates/processed foods.    Subclinical hypothyroidism    Stable and will continue to monitor thyroid function.    Orders:  •  TSH, 3rd generation with Free T4 reflex; Future  •  Thyroid stimulating immunoglobulin; Future  •  Anti-microsomal antibody; Future    Hyperparathyroidism (HCC)    This diagnosis is listed on her chart but could not find any evidence to support this diagnosis. PTH in the past was normal. Last calcium was normal. Her DXA scan was normal. Will check updated PTH. If PTH is normal, will resolve this diagnosis.    Orders:  •  PTH, intact; Future    History of malignant neoplasm of colon    S/p right hemicolectomy in 2016. She is going to get a colonoscopy in the fall. Follow-up with [facility].    Encounter for screening mammogram for breast cancer    Orders:  •  US breast screening bilateral complete (ABUS); Future         History of Present Illness     History of Present Illness  The patient presents for evaluation of iron deficiency, thyroid nodule, hemorrhoids, and dense breast tissue.    She has a history of colon cancer, which was treated with a right hemicolectomy in 2016. She has previously experienced iron deficiency, but her recent blood count showed normal hemoglobin levels. She is not currently taking any prescription medications but does take vitamins, zinc, quercetin, and B12 complex. She juices weekly and has eliminated red meat from her diet. She followed a vegan diet for 5 years.    A thyroid nodule was detected, and she consulted Dr. Maldonado at [facility]. She is considering another blood test to check her thyroid and thyroid antibodies.    She reports having  "hemorrhoids, which were identified during a colonoscopy in 2018. Her last colonoscopy was in 2021, and another is scheduled for 11/2025. She has been advised against surgical removal of the hemorrhoids as they may recur. She experiences infrequent bowel movements and believes this may be due to her weight. The hemorrhoids cause discomfort when sitting, particularly at the end of the day when she is fatigued.    She has been informed that she needs a mammogram but was told that her previous cancer could develop into adenocarcinoma in any part of her breast. She has dense breasts and is unable to undergo an MRI due to claustrophobia. She is interested in an ultrasound as an alternative to a mammogram.    She has no known history of hyperparathyroidism or Sjogren's syndrome even though these diagnoses were listed on her chart.    PAST SURGICAL HISTORY:  Right hemicolectomy (2016)    FAMILY HISTORY  Her father had hemorrhoids.     Review of Systems   Constitutional: Negative.    Respiratory: Negative.     Cardiovascular: Negative.    Gastrointestinal: Negative.      Objective   /78 (BP Location: Left arm, Patient Position: Sitting, Cuff Size: Large)   Pulse 69   Temp 98.4 °F (36.9 °C) (Tympanic)   Resp 18   Ht 5' 3.75\" (1.619 m)   Wt 68 kg (150 lb)   SpO2 99%   BMI 25.95 kg/m²     Physical Exam  Constitutional:       General: She is not in acute distress.     Appearance: She is not ill-appearing.     Cardiovascular:      Rate and Rhythm: Normal rate and regular rhythm.      Heart sounds: No murmur heard.  Pulmonary:      Effort: Pulmonary effort is normal. No respiratory distress.      Breath sounds: No wheezing.   Abdominal:      General: Bowel sounds are normal. There is no distension.      Tenderness: There is no abdominal tenderness.     Musculoskeletal:      Right lower leg: No edema.      Left lower leg: No edema.     Neurological:      Mental Status: She is alert.       Joaquin Gaytanlenny,    "

## 2025-07-03 NOTE — ASSESSMENT & PLAN NOTE
Stable and will continue to monitor thyroid function.    Orders:  •  TSH, 3rd generation with Free T4 reflex; Future  •  Thyroid stimulating immunoglobulin; Future  •  Anti-microsomal antibody; Future

## 2025-07-03 NOTE — ASSESSMENT & PLAN NOTE
Most recent A1c was 5.7 % on 4/3/2025. Continue healthy lifestyle and avoiding refined carbohydrates/processed foods.

## 2025-07-03 NOTE — ASSESSMENT & PLAN NOTE
This diagnosis is listed on her chart but could not find any evidence to support this diagnosis. PTH in the past was normal. Last calcium was normal. Her DXA scan was normal. Will check updated PTH. If PTH is normal, will resolve this diagnosis.    Orders:  •  PTH, intact; Future

## 2025-07-03 NOTE — ASSESSMENT & PLAN NOTE
S/p right hemicolectomy in 2016. She is going to get a colonoscopy in the fall. Follow-up with [facility].

## 2025-07-04 LAB — THYROPEROXIDASE AB SERPL-ACNC: 10 IU/ML (ref 0–34)

## 2025-07-07 ENCOUNTER — RESULTS FOLLOW-UP (OUTPATIENT)
Age: 70
End: 2025-07-07

## 2025-07-07 LAB — TSI SER-ACNC: <0.1 IU/L (ref 0–0.55)

## 2025-07-07 NOTE — TELEPHONE ENCOUNTER
----- Message from Joaquin Barillas DO sent at 7/7/2025  6:38 AM EDT -----  Call patient and let her know that I reviewed their recent laboratory testing and labs were normal.    ----- Message -----  From: Lab, Background User  Sent: 7/3/2025   8:25 PM EDT  To: Joaquin Barillas, DO

## 2025-07-08 NOTE — TELEPHONE ENCOUNTER
Re:   lab results   Provider's message relayed in full detail    Left voice mail on home phone asking pt to return call w/any questions.

## 2025-07-10 NOTE — TELEPHONE ENCOUNTER
Patient returned call, relayed results to patient as per provider message.     Patient expressed understanding, she was advised to   call if she had any further questions.     Thank you.